# Patient Record
Sex: FEMALE | Race: BLACK OR AFRICAN AMERICAN | Employment: OTHER | URBAN - METROPOLITAN AREA
[De-identification: names, ages, dates, MRNs, and addresses within clinical notes are randomized per-mention and may not be internally consistent; named-entity substitution may affect disease eponyms.]

---

## 2017-10-09 ENCOUNTER — DOCUMENTATION ONLY (OUTPATIENT)
Dept: INTERNAL MEDICINE CLINIC | Age: 27
End: 2017-10-09

## 2017-10-09 ENCOUNTER — OFFICE VISIT (OUTPATIENT)
Dept: INTERNAL MEDICINE CLINIC | Age: 27
End: 2017-10-09

## 2017-10-09 VITALS
RESPIRATION RATE: 16 BRPM | HEART RATE: 79 BPM | DIASTOLIC BLOOD PRESSURE: 56 MMHG | WEIGHT: 90.8 LBS | HEIGHT: 59 IN | OXYGEN SATURATION: 100 % | SYSTOLIC BLOOD PRESSURE: 100 MMHG | TEMPERATURE: 97.9 F | BODY MASS INDEX: 18.31 KG/M2

## 2017-10-09 DIAGNOSIS — K64.2 GRADE III HEMORRHOIDS: ICD-10-CM

## 2017-10-09 DIAGNOSIS — K50.818 CROHN'S DISEASE OF BOTH SMALL AND LARGE INTESTINE WITH OTHER COMPLICATION (HCC): ICD-10-CM

## 2017-10-09 DIAGNOSIS — Z00.00 WELL WOMAN EXAM (NO GYNECOLOGICAL EXAM): Primary | ICD-10-CM

## 2017-10-09 RX ORDER — HYDROCORTISONE ACETATE 25 MG/1
25 SUPPOSITORY RECTAL EVERY 12 HOURS
Qty: 14 SUPPOSITORY | Refills: 0 | Status: SHIPPED | OUTPATIENT
Start: 2017-10-09 | End: 2017-10-16

## 2017-10-09 NOTE — PROGRESS NOTES
Chief Complaint   Patient presents with   [facility]     1. Have you been to the ER, urgent care clinic since your last visit? Hospitalized since your last visit? No    2. Have you seen or consulted any other health care providers outside of the [address] [place] [address] since your last visit? Include any pap smears or colon screening.  No    Patient have discomfort anal area, itching

## 2017-10-09 NOTE — PROGRESS NOTES
HISTORY OF PRESENT ILLNESS  Marlen Domingo is a 32 y.o. female. HPI  Marlen Domingo is new to my practice. Prior care: her prior care was with Dr. Taylor Hi. Last seen there 5 years ago. Records have been reviewed. Health Maintenance  Health Maintenance   Topic Date Due    DTaP/Tdap/Td series (1 - Tdap) 06/02/2011    PAP AKA CERVICAL CYTOLOGY  06/02/2011    INFLUENZA AGE 9 TO ADULT  08/01/2017     Crohn's  Disease  Diagnosis 4 years ago. She is status post colectomy and has also been on infliximab in the past.  Her last infliximab infusion was 1 year ago it was stopped after the birth of her child due to insurance purposes. She has not been to gastroenterology in almost a year. To 8 outstanding medical bills also she wishes to manage her Crohn's \" naturally' and has been making dietary changes. She reports having less than 4 stools per day on average has 2 semi-formed stools. Denies abdominal pain, nausea, vomiting, stool leakage, melena, hematochezia fevers or night sweats. Anal Itching  Started 2 weeks ago. Denies hematochezia, anal intercourse,  & H/o hemorrhoids. SHx: Mental health jean. [facility]. 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 Eyes: Negative for blurred vision and pain. Respiratory: Negative for shortness of breath. Cardiovascular: Negative for chest pain, orthopnea and leg swelling. Gastrointestinal:        Per HPI   Neurological: Negative for focal weakness and headaches. Psychiatric/Behavioral: Negative for depression.      Patient Active Problem List    Diagnosis Date Noted    [facility] 08/10/2012    Gastritis 07/30/2012    Acne     Eczema     Seasonal allergic rhinitis            Allergies   Allergen Reactions    Chocolate Flavor Hives and Swelling     Facial swelling      Visit Vitals    /56 (BP 1 Location: Right arm, BP Patient Position: Sitting)    Pulse 79    Temp 97.9 °F (36.6 °C) (Oral)    Resp 16    Ht 4' 10.62\" (1.489 m)    Wt 90 lb 12.8 oz (41.2 kg)    SpO2 100%    BMI 18.58 kg/m2       Physical Exam   Constitutional: She is oriented to person, place, and time. She appears well-developed. No distress. Eyes: Conjunctivae are normal.   Neck: Neck supple. Cardiovascular: Normal rate, regular rhythm and normal heart sounds. Pulmonary/Chest: Effort normal and breath sounds normal. No respiratory distress. She has no wheezes. She has no rales. She exhibits no tenderness. Genitourinary: Rectal exam shows external hemorrhoid and fissure. Rectal exam shows no tenderness. Genitourinary Comments: Nurse chaperone present   Nae Wilson      Neurological: She is alert and oriented to person, place, and time. Skin: Skin is warm. Psychiatric: She has a normal mood and affect. ASSESSMENT and PLAN  Diagnoses and all orders for this visit:    1. Well woman exam (no gynecological exam)-screening labs ordered. Patient declines flu vaccine today.   -     CBC WITH AUTOMATED DIFF  -     LIPID PANEL  -     METABOLIC PANEL, COMPREHENSIVE  -     THYROID PANEL  -     TSH 3RD GENERATION  -     VITAMIN D, 25 HYDROXY  -     VITAMIN B12 & FOLATE  -     IRON PROFILE    2. Crohn's disease of both small and large intestine with other complication (HCC) overdue for gastroenterology follow-up. Notes stable symptoms fortunately. Will defer further workup at this time she she was scheduled with gastroenterology for October 21. Red flag symptoms warrant earlier clinical evaluation or ER presentation was reviewed. -     VITAMIN B12 & FOLATE  -     IRON PROFILE  -     REFERRAL TO GASTROENTEROLOGY    3. Lactating motherresume prenatal vitamin  -     -folic-omega-3-fish oil 400-32.5 mcg-mg chew; Take 1 Tab by mouth daily.     4. Grade III hemorrhoidstrial of Entocort until she is seen by gastroenterology.  -     REFERRAL TO GASTROENTEROLOGY  -     hydrocortisone (ANUCORT-HC) 25 mg supp; Insert 1 Suppository into rectum every twelve (12) hours for 7 days. Follow-up Disposition:  Return in about 3 months (around 1/9/2018) for Follow-up. Medication risks/benefits/costs/interactions/alternatives discussed with patient. Delfina Palumbo  was given an after visit summary which includes diagnoses, current medications, & vitals. she expressed understanding with the diagnosis and plan.

## 2017-10-09 NOTE — PATIENT INSTRUCTIONS
It was a pleasure to see you! As discussed:    Health Maintenance   I have ordered your age appropriate labs please complete them. You will need to fast 10-12hrs before your appointment. Remember goal for exercise is 150minutes of moderate exercise a week and diet goal is to eat 50% fruits and vegetables with minimal sugar, fat and oil daily. See health.gov or choosemyplate.gov for more details. Your cervical cancer and breast cancer screening will be completed by your ob/ gyn as scheduled. Crohn's Disease  See Gastroenterology as scheduled. Start multivitamin         Hemorrhoids: Care Instructions  Your Care Instructions    Hemorrhoids are enlarged veins that develop in the anal canal. Bleeding during bowel movements, itching, swelling, and rectal pain are the most common symptoms. They can be uncomfortable at times, but hemorrhoids rarely are a serious problem. You can treat most hemorrhoids with simple changes to your diet and bowel habits. These changes include eating more fiber and not straining to pass stools. Most hemorrhoids do not need surgery or other treatment unless they are very large and painful or bleed a lot. Follow-up care is a key part of your treatment and safety. Be sure to make and go to all appointments, and call your doctor if you are having problems. Its also a good idea to know your test results and keep a list of the medicines you take. How can you care for yourself at home? · Sit in a few inches of warm water (sitz bath) 3 times a day and after bowel movements. The warm water helps with pain and itching. · Put ice on your anal area several times a day for 10 minutes at a time. Put a thin cloth between the ice and your skin. Follow this by placing a warm, wet towel on the area for another 10 to 20 minutes. · Take pain medicines exactly as directed. ¨ If the doctor gave you a prescription medicine for pain, take it as prescribed.   ¨ If you are not taking a prescription pain medicine, ask your doctor if you can take an over-the-counter medicine. · Keep the anal area clean, but be gentle. Use water and a fragrance-free soap, such as [place], or use baby wipes or medicated pads, such as Tucks. · Wear cotton underwear and loose clothing to decrease moisture in the anal area. · Eat more fiber. Include foods such as whole-grain breads and cereals, raw vegetables, raw and dried fruits, and beans. · Drink plenty of fluids, enough so that your urine is light yellow or clear like water. If you have kidney, heart, or liver disease and have to limit fluids, talk with your doctor before you increase the amount of fluids you drink. · Use a stool softener that contains bran or psyllium. You can save money by buying bran or psyllium (available in bulk at most health food stores) and sprinkling it on foods or stirring it into fruit juice. Or you can use a product such as Metamucil or Hydrocil. · Practice healthy bowel habits. ¨ Go to the bathroom as soon as you have the urge. ¨ Avoid straining to pass stools. Relax and give yourself time to let things happen naturally. ¨ Do not hold your breath while passing stools. ¨ Do not read while sitting on the toilet. Get off the toilet as soon as you have finished. · Take your medicines exactly as prescribed. Call your doctor if you think you are having a problem with your medicine. When should you call for help? Call 911 anytime you think you may need emergency care. For example, call if:  · You pass maroon or very bloody stools. Call your doctor now or seek immediate medical care if:  · You have increased pain. · You have increased bleeding. Watch closely for changes in your health, and be sure to contact your doctor if:  · Your symptoms have not improved after 3 or 4 days. Where can you learn more? Go to http://bebeto-marielos.info/.   Enter F228 in the search box to learn more about \"Hemorrhoids: Care Instructions. \"  Current as of: August 9, 2016  Content Version: 11.3  © 5454-8358 [employer]. Care instructions adapted under license by [employer] (which disclaims liability or warranty for this information). If you have questions about a medical condition or this instruction, always ask your healthcare professional. [address] any warranty or liability for your use of this information. Crohn's Disease: Care Instructions  Your Care Instructions    Crohn's disease is a lifelong inflammatory bowel disease (IBD). Parts of the digestive tract get swollen and irritated and may develop deep sores called ulcers. Crohn's disease usually occurs in the last part of the small intestine and the first part of the large intestine. But it can develop anywhere from the mouth to the anus. The main symptoms of Crohn's disease are belly pain, diarrhea, fever, and weight loss. Some people may have constipation. Crohn's disease also sometimes causes problems with the joints, eyes, or skin. Your symptoms may be mild at some times and severe at others. The disease can also go into remission, which means that it is not active and you have no symptoms. Bad attacks of Crohn's disease often have to be treated in the hospital so that you can get medicines and liquids through a tube in your vein, called an IV. This gives your digestive system time to rest and recover. Talk with your doctor about the best treatments for you. You may need medicines that help prevent or treat flare-ups of the disease. You may need surgery to remove part of your bowel if you have an abnormal opening in the bowel (fistula), an abscess, or a bowel obstruction. In some cases, surgery is needed if medicines do not work. But symptoms often return to other areas of the intestines after surgery. Learning good self-care can help you reduce your symptoms and manage Crohn's disease.   Follow-up care is a key part of your treatment and safety. Be sure to make and go to all appointments, and call your doctor if you are having problems. Its also a good idea to know your test results and keep a list of the medicines you take. How can you care for yourself at home? · Take your medicines exactly as prescribed. Call your doctor if you think you are having a problem with your medicine. You will get more details on the specific medicines your doctor prescribes. · Do not take anti-inflammatory medicines, such as aspirin, ibuprofen (Advil, Motrin), or naproxen (Aleve). They may make your symptoms worse. Do not take any other medicines or herbal products without talking to your doctor first.  · Avoid foods that make your symptoms worse. These might include milk, alcohol, high-fiber foods, or spicy foods. · Eat a healthy diet. Make sure to get enough iron. Rectal bleeding may make you lose iron. Good sources of iron include beef, lentils, spinach, raisins, and iron-enriched breads and cereals. · Drink liquid meal replacements if your doctor recommends them. These are high in calories and contain vitamins and minerals. Severe symptoms may make it hard for your body to absorb vitamins and minerals. · Do not smoke. Smoking makes Crohn's disease worse. If you need help quitting, talk to your doctor about stop-smoking programs and medicines. These can increase your chances of quitting for good. · Seek support from friends and family to help cope with Crohn's disease. The illness can affect all parts of your life. Get counseling if you need it. When should you call for help? Call 911 anytime you think you may need emergency care. For example, call if:  · You have severe belly pain. · You passed out (lost consciousness). Call your doctor now or seek immediate medical care if:  · You have signs of needing more fluids. You have sunken eyes and a dry mouth, and you pass only a little dark urine.   · You have pain and swelling in the anal area, or you have pus draining from the anal area. · You have a fever or shaking chills. · Your belly is bloated. Watch closely for changes in your health, and be sure to contact your doctor if:  · Your symptoms get worse. · You have diarrhea for more than 2 weeks. · Your pain is not steadily getting better. · You have unexplained weight loss. Where can you learn more? Go to http://bebeto-marielos.info/. Enter 21 [phone] in the search box to learn more about \"Crohn's Disease: Care Instructions. \"  Current as of: August 9, 2016  Content Version: 11.3  © 2007-4053 [employer]. Care instructions adapted under license by [employer] (which disclaims liability or warranty for this information). If you have questions about a medical condition or this instruction, always ask your healthcare professional. [address] any warranty or liability for your use of this information.

## 2017-10-09 NOTE — MR AVS SNAPSHOT
Visit Information Date & Time Provider Department Dept. Phone Encounter #  
 10/9/2017  2:00 PM Lashell Tyler MD [facility] Internal Medicine [phone] [id number] Follow-up Instructions Return in about 3 months (around 1/9/2018) for Follow-up. Upcoming Health Maintenance Date Due DTaP/Tdap/Td series (1 - Tdap) 6/2/2011 PAP AKA CERVICAL CYTOLOGY 4/1/2019 Allergies as of 10/9/2017  Review Complete On: 10/9/2017 By: Lashell Tyler MD  
  
 Severity Noted Reaction Type Reactions Chocolate Flavor  06/29/2012    Hives, Swelling Facial swelling Current Immunizations  Reviewed on 6/29/2012 No immunizations on file. Not reviewed this visit You Were Diagnosed With   
  
 Codes Comments Well woman exam (no gynecological exam)    -  Primary ICD-10-CM: Z00.00 ICD-9-CM: V70.0 Crohn's disease of both small and large intestine with other complication ([facility].)     QXL-67-HZ: P15.248 ICD-9-CM: 555.2 Lactating mother     ICD-10-CM: Z39.1 ICD-9-CM: V24.1 Grade III hemorrhoids     ICD-10-CM: Z77.1 ICD-9-CM: 455.0 Vitals BP Pulse Temp Resp Height(growth percentile) Weight(growth percentile) 100/56 (BP 1 Location: Right arm, BP Patient Position: Sitting) 79 97.9 °F (36.6 °C) (Oral) 16 4' 10.62\" (1.489 m) 90 lb 12.8 oz (41.2 kg) SpO2 BMI OB Status Smoking Status 100% 18.58 kg/m2 Unknown Never Smoker Vitals History BMI and BSA Data Body Mass Index Body Surface Area 18.58 kg/m 2 1.31 m 2 Preferred Pharmacy Pharmacy Name Phone Radha Robledo [facility] [address]  [place] [phone] Your Updated Medication List  
  
   
This list is accurate as of: 10/9/17  2:52 PM.  Always use your most recent med list.  
  
  
  
  
 hydrocortisone 25 mg Supp Commonly known as:  ANUCORT-HC  
 Insert 1 Suppository into rectum every twelve (12) hours for 7 days. -folic-omega-3-fish oil 400-32.5 mcg-mg [facility] Take 1 Tab by mouth daily. Prescriptions Sent to Pharmacy Refills -folic-omega-3-fish oil 400-32.5 mcg-mg chew 4 Sig: Take 1 Tab by mouth daily. Class: Normal  
 Pharmacy: [address] Ph #: [phone] Route: Oral  
 hydrocortisone (ANUCORT-HC) 25 mg supp 0 Sig: Insert 1 Suppository into rectum every twelve (12) hours for 7 days. Class: Normal  
 Pharmacy: [address] Ph #: [phone] Route: Rectal  
  
We Performed the Following CBC WITH AUTOMATED DIFF [39975 CPT(R)] IRON PROFILE K480718 CPT(R)] LIPID PANEL [95297 CPT(R)] METABOLIC PANEL, COMPREHENSIVE [93458 CPT(R)] REFERRAL TO GASTROENTEROLOGY [JQY06 Custom] THYROID PANEL S364317 CPT(R)] TSH 3RD GENERATION [15822 CPT(R)] VITAMIN B12 & FOLATE [88345 CPT(R)] VITAMIN D, 25 HYDROXY [id number] CPT(R)] Follow-up Instructions Return in about 3 months (around 1/9/2018) for Follow-up. Referral Information Referral ID Referred By Referred To  
  
 [id number] BARRETT, [id number]  Michael Galeas MD   
   [address] [place] [address], [facility] Phone: [phone] Fax: [phone] Visits Status Start Date End Date 1 New Request 10/9/17 10/9/18 If your referral has a status of pending review or denied, additional information will be sent to support the outcome of this decision. Patient Instructions It was a pleasure to see you! As discussed: 
 
Health Maintenance I have ordered your age appropriate labs please complete them. You will need to fast 10-12hrs before your appointment. Remember goal for exercise is 150minutes of moderate exercise a week and diet goal is to eat 50% fruits and vegetables with minimal sugar, fat and oil daily. See health.gov or choosemyplate.gov for more details. Your cervical cancer and breast cancer screening will be completed by your ob/ gyn as scheduled. Crohn's Disease See Gastroenterology as scheduled. Start multivitamin Hemorrhoids: Care Instructions Your Care Instructions Hemorrhoids are enlarged veins that develop in the anal canal. Bleeding during bowel movements, itching, swelling, and rectal pain are the most common symptoms. They can be uncomfortable at times, but hemorrhoids rarely are a serious problem. You can treat most hemorrhoids with simple changes to your diet and bowel habits. These changes include eating more fiber and not straining to pass stools. Most hemorrhoids do not need surgery or other treatment unless they are very large and painful or bleed a lot. Follow-up care is a key part of your treatment and safety. Be sure to make and go to all appointments, and call your doctor if you are having problems. Its also a good idea to know your test results and keep a list of the medicines you take. How can you care for yourself at home? · Sit in a few inches of warm water (sitz bath) 3 times a day and after bowel movements. The warm water helps with pain and itching. · Put ice on your anal area several times a day for 10 minutes at a time. Put a thin cloth between the ice and your skin. Follow this by placing a warm, wet towel on the area for another 10 to 20 minutes. · Take pain medicines exactly as directed. ¨ If the doctor gave you a prescription medicine for pain, take it as prescribed. ¨ If you are not taking a prescription pain medicine, ask your doctor if you can take an over-the-counter medicine. · Keep the anal area clean, but be gentle.  Use water and a fragrance-free soap, such as [place], or use baby wipes or medicated pads, such as Tucks. · Wear cotton underwear and loose clothing to decrease moisture in the anal area. · Eat more fiber. Include foods such as whole-grain breads and cereals, raw vegetables, raw and dried fruits, and beans. · Drink plenty of fluids, enough so that your urine is light yellow or clear like water. If you have kidney, heart, or liver disease and have to limit fluids, talk with your doctor before you increase the amount of fluids you drink. · Use a stool softener that contains bran or psyllium. You can save money by buying bran or psyllium (available in bulk at most health food stores) and sprinkling it on foods or stirring it into fruit juice. Or you can use a product such as Metamucil or Hydrocil. · Practice healthy bowel habits. ¨ Go to the bathroom as soon as you have the urge. ¨ Avoid straining to pass stools. Relax and give yourself time to let things happen naturally. ¨ Do not hold your breath while passing stools. ¨ Do not read while sitting on the toilet. Get off the toilet as soon as you have finished. · Take your medicines exactly as prescribed. Call your doctor if you think you are having a problem with your medicine. When should you call for help? Call 911 anytime you think you may need emergency care. For example, call if: 
· You pass maroon or very bloody stools. Call your doctor now or seek immediate medical care if: 
· You have increased pain. · You have increased bleeding. Watch closely for changes in your health, and be sure to contact your doctor if: 
· Your symptoms have not improved after 3 or 4 days. Where can you learn more? Go to http://bebeto-marielos.info/. Enter F228 in the search box to learn more about \"Hemorrhoids: Care Instructions. \" Current as of: August 9, 2016 Content Version: 11.3 © 6371-5980 [employer].  Care instructions adapted under license by [address] (which disclaims liability or warranty for this information). If you have questions about a medical condition or this instruction, always ask your healthcare professional. [address] any warranty or liability for your use of this information. Crohn's Disease: Care Instructions Your Care Instructions Crohn's disease is a lifelong inflammatory bowel disease (IBD). Parts of the digestive tract get swollen and irritated and may develop deep sores called ulcers. Crohn's disease usually occurs in the last part of the small intestine and the first part of the large intestine. But it can develop anywhere from the mouth to the anus. The main symptoms of Crohn's disease are belly pain, diarrhea, fever, and weight loss. Some people may have constipation. Crohn's disease also sometimes causes problems with the joints, eyes, or skin. Your symptoms may be mild at some times and severe at others. The disease can also go into remission, which means that it is not active and you have no symptoms. Bad attacks of Crohn's disease often have to be treated in the hospital so that you can get medicines and liquids through a tube in your vein, called an IV. This gives your digestive system time to rest and recover. Talk with your doctor about the best treatments for you. You may need medicines that help prevent or treat flare-ups of the disease. You may need surgery to remove part of your bowel if you have an abnormal opening in the bowel (fistula), an abscess, or a bowel obstruction. In some cases, surgery is needed if medicines do not work. But symptoms often return to other areas of the intestines after surgery. Learning good self-care can help you reduce your symptoms and manage Crohn's disease. Follow-up care is a key part of your treatment and safety.  Be sure to make and go to all appointments, and call your doctor if you are having problems. Its also a good idea to know your test results and keep a list of the medicines you take. How can you care for yourself at home? · Take your medicines exactly as prescribed. Call your doctor if you think you are having a problem with your medicine. You will get more details on the specific medicines your doctor prescribes. · Do not take anti-inflammatory medicines, such as aspirin, ibuprofen (Advil, Motrin), or naproxen (Aleve). They may make your symptoms worse. Do not take any other medicines or herbal products without talking to your doctor first. 
· Avoid foods that make your symptoms worse. These might include milk, alcohol, high-fiber foods, or spicy foods. · Eat a healthy diet. Make sure to get enough iron. Rectal bleeding may make you lose iron. Good sources of iron include beef, lentils, spinach, raisins, and iron-enriched breads and cereals. · Drink liquid meal replacements if your doctor recommends them. These are high in calories and contain vitamins and minerals. Severe symptoms may make it hard for your body to absorb vitamins and minerals. · Do not smoke. Smoking makes Crohn's disease worse. If you need help quitting, talk to your doctor about stop-smoking programs and medicines. These can increase your chances of quitting for good. · Seek support from friends and family to help cope with Crohn's disease. The illness can affect all parts of your life. Get counseling if you need it. When should you call for help? Call 911 anytime you think you may need emergency care. For example, call if: 
· You have severe belly pain. · You passed out (lost consciousness). Call your doctor now or seek immediate medical care if: 
· You have signs of needing more fluids. You have sunken eyes and a dry mouth, and you pass only a little dark urine. · You have pain and swelling in the anal area, or you have pus draining from the anal area. · You have a fever or shaking chills. · Your belly is bloated. Watch closely for changes in your health, and be sure to contact your doctor if: 
· Your symptoms get worse. · You have diarrhea for more than 2 weeks. · Your pain is not steadily getting better. · You have unexplained weight loss. Where can you learn more? Go to http://bebeto-marielos.info/. Enter 21 [phone] in the search box to learn more about \"Crohn's Disease: Care Instructions. \" Current as of: August 9, 2016 Content Version: 11.3 © 8739-4079 [employer]. Care instructions adapted under license by [employer] (which disclaims liability or warranty for this information). If you have questions about a medical condition or this instruction, always ask your healthcare professional. [address] any warranty or liability for your use of this information. Introducing [facility]! Dear Norma Quintana: 
Thank you for requesting a [employer] account. Our records indicate that you already have an active [employer] account. You can access your account anytime at [email or web address] Did you know that you can access your hospital and ER discharge instructions at any time in [employer]? You can also review all of your test results from your hospital stay or ER visit. Additional Information If you have questions, please visit the Frequently Asked Questions section of the [employer] website at [email or web address] Remember, [employer] is NOT to be used for urgent needs. For medical emergencies, dial 911. Now available from your iPhone and Android! Please provide this summary of care documentation to your next provider. Your primary care clinician is listed as Rebeca Flowers. If you have any questions after today's visit, please call [phone].

## 2018-04-13 LAB
CHLAMYDIA, EXTERNAL: NEGATIVE
HBSAG, EXTERNAL: NEGATIVE
HIV, EXTERNAL: NON REACTIVE
N. GONORRHEA, EXTERNAL: NEGATIVE
RUBELLA, EXTERNAL: NORMAL
TYPE, ABO & RH, EXTERNAL: NORMAL

## 2018-08-08 ENCOUNTER — HOSPITAL ENCOUNTER (EMERGENCY)
Age: 28
End: 2018-08-08
Attending: OBSTETRICS & GYNECOLOGY | Admitting: OBSTETRICS & GYNECOLOGY

## 2018-09-04 ENCOUNTER — ANESTHESIA EVENT (OUTPATIENT)
Dept: LABOR AND DELIVERY | Age: 28
End: 2018-09-04

## 2018-09-05 ENCOUNTER — HOSPITAL ENCOUNTER (INPATIENT)
Age: 28
LOS: 3 days | Discharge: HOME | End: 2018-09-08
Attending: OBSTETRICS & GYNECOLOGY | Admitting: OBSTETRICS & GYNECOLOGY
Payer: COMMERCIAL

## 2018-09-05 LAB
ERYTHROCYTE [DISTWIDTH] IN BLOOD BY AUTOMATED COUNT: 14.6 % (ref 11.5–14.5)
HCT VFR BLD AUTO: 33.1 % (ref 35–47)
HGB BLD-MCNC: 10.8 G/DL (ref 11.5–16)
MCH RBC QN AUTO: 28.7 PG (ref 26–34)
MCHC RBC AUTO-ENTMCNC: 32.6 G/DL (ref 30–36.5)
MCV RBC AUTO: 88 FL (ref 80–99)
NRBC # BLD: 0 K/UL (ref 0–0.01)
NRBC BLD-RTO: 0 PER 100 WBC
PLATELET # BLD AUTO: 173 K/UL (ref 150–400)
PMV BLD AUTO: 11.2 FL (ref 8.9–12.9)
RBC # BLD AUTO: 3.76 M/UL (ref 3.8–5.2)
WBC # BLD AUTO: 9.2 K/UL (ref 3.6–11)

## 2018-09-05 PROCEDURE — C1765 ADHESION BARRIER: HCPCS

## 2018-09-05 PROCEDURE — 77030008459 HC STPLR SKN COOP -B

## 2018-09-05 PROCEDURE — 77030031139 HC SUT VCRL2 J&J -A

## 2018-09-05 PROCEDURE — 10907ZC DRAINAGE OF AMNIOTIC FLUID, THERAPEUTIC FROM PRODUCTS OF CONCEPTION, VIA NATURAL OR ARTIFICIAL OPENING: ICD-10-PCS | Performed by: OBSTETRICS & GYNECOLOGY

## 2018-09-05 PROCEDURE — A4300 CATH IMPL VASC ACCESS PORTAL: HCPCS

## 2018-09-05 PROCEDURE — 77030018836 HC SOL IRR NACL ICUM -A

## 2018-09-05 PROCEDURE — 85027 COMPLETE CBC AUTOMATED: CPT | Performed by: OBSTETRICS & GYNECOLOGY

## 2018-09-05 PROCEDURE — 74011250636 HC RX REV CODE- 250/636: Performed by: OBSTETRICS & GYNECOLOGY

## 2018-09-05 PROCEDURE — 77030018846 HC SOL IRR STRL H20 ICUM -A

## 2018-09-05 PROCEDURE — 74011250636 HC RX REV CODE- 250/636: Performed by: ANESTHESIOLOGY

## 2018-09-05 PROCEDURE — 77030007866 HC KT SPN ANES BBMI -B: Performed by: ANESTHESIOLOGY

## 2018-09-05 PROCEDURE — 76010000392 HC C SECN EA ADDL 0.5 HR: Performed by: OBSTETRICS & GYNECOLOGY

## 2018-09-05 PROCEDURE — 74011000258 HC RX REV CODE- 258: Performed by: ANESTHESIOLOGY

## 2018-09-05 PROCEDURE — 77030011220 HC DEV ELECSURG COVD -B

## 2018-09-05 PROCEDURE — 36415 COLL VENOUS BLD VENIPUNCTURE: CPT | Performed by: OBSTETRICS & GYNECOLOGY

## 2018-09-05 PROCEDURE — 76060000078 HC EPIDURAL ANESTHESIA: Performed by: OBSTETRICS & GYNECOLOGY

## 2018-09-05 PROCEDURE — 65410000002 HC RM PRIVATE OB

## 2018-09-05 PROCEDURE — 77030011255 HC DSG AQUACEL AG BMS -A

## 2018-09-05 PROCEDURE — 75410000003 HC RECOV DEL/VAG/CSECN EA 0.5 HR: Performed by: OBSTETRICS & GYNECOLOGY

## 2018-09-05 PROCEDURE — 74011250637 HC RX REV CODE- 250/637: Performed by: OBSTETRICS & GYNECOLOGY

## 2018-09-05 PROCEDURE — 74011000250 HC RX REV CODE- 250

## 2018-09-05 PROCEDURE — 77030032490 HC SLV COMPR SCD KNE COVD -B

## 2018-09-05 PROCEDURE — 74011250636 HC RX REV CODE- 250/636

## 2018-09-05 PROCEDURE — 76010000391 HC C SECN FIRST 1 HR: Performed by: OBSTETRICS & GYNECOLOGY

## 2018-09-05 RX ORDER — KETOROLAC TROMETHAMINE 30 MG/ML
30 INJECTION, SOLUTION INTRAMUSCULAR; INTRAVENOUS
Status: CANCELLED | OUTPATIENT
Start: 2018-09-05 | End: 2018-09-06

## 2018-09-05 RX ORDER — OXYCODONE AND ACETAMINOPHEN 5; 325 MG/1; MG/1
1 TABLET ORAL
Status: DISCONTINUED | OUTPATIENT
Start: 2018-09-05 | End: 2018-09-08 | Stop reason: HOSPADM

## 2018-09-05 RX ORDER — ONDANSETRON 4 MG/1
4 TABLET, ORALLY DISINTEGRATING ORAL
Status: DISCONTINUED | OUTPATIENT
Start: 2018-09-05 | End: 2018-09-08 | Stop reason: HOSPADM

## 2018-09-05 RX ORDER — HYDROCORTISONE ACETATE PRAMOXINE HCL 2.5; 1 G/100G; G/100G
CREAM TOPICAL AS NEEDED
Status: DISCONTINUED | OUTPATIENT
Start: 2018-09-05 | End: 2018-09-08 | Stop reason: HOSPADM

## 2018-09-05 RX ORDER — ONDANSETRON 2 MG/ML
INJECTION INTRAMUSCULAR; INTRAVENOUS AS NEEDED
Status: DISCONTINUED | OUTPATIENT
Start: 2018-09-05 | End: 2018-09-05 | Stop reason: HOSPADM

## 2018-09-05 RX ORDER — OXYTOCIN/RINGER'S LACTATE 20/1000 ML
125-1000 PLASTIC BAG, INJECTION (ML) INTRAVENOUS AS NEEDED
Status: DISCONTINUED | OUTPATIENT
Start: 2018-09-05 | End: 2018-09-08 | Stop reason: HOSPADM

## 2018-09-05 RX ORDER — DIPHENHYDRAMINE HCL 25 MG
50 CAPSULE ORAL
Status: DISCONTINUED | OUTPATIENT
Start: 2018-09-05 | End: 2018-09-08 | Stop reason: HOSPADM

## 2018-09-05 RX ORDER — ACETAMINOPHEN 325 MG/1
650 TABLET ORAL
Status: DISCONTINUED | OUTPATIENT
Start: 2018-09-05 | End: 2018-09-08 | Stop reason: HOSPADM

## 2018-09-05 RX ORDER — OXYCODONE AND ACETAMINOPHEN 5; 325 MG/1; MG/1
2 TABLET ORAL
Status: DISCONTINUED | OUTPATIENT
Start: 2018-09-05 | End: 2018-09-08 | Stop reason: HOSPADM

## 2018-09-05 RX ORDER — BUPIVACAINE HYDROCHLORIDE 7.5 MG/ML
INJECTION, SOLUTION EPIDURAL; RETROBULBAR AS NEEDED
Status: DISCONTINUED | OUTPATIENT
Start: 2018-09-05 | End: 2018-09-05 | Stop reason: HOSPADM

## 2018-09-05 RX ORDER — MORPHINE SULFATE 1 MG/ML
INJECTION, SOLUTION EPIDURAL; INTRATHECAL; INTRAVENOUS AS NEEDED
Status: DISCONTINUED | OUTPATIENT
Start: 2018-09-05 | End: 2018-09-05 | Stop reason: HOSPADM

## 2018-09-05 RX ORDER — AMMONIA 15 % (W/V)
1 AMPUL (EA) INHALATION AS NEEDED
Status: DISCONTINUED | OUTPATIENT
Start: 2018-09-05 | End: 2018-09-08 | Stop reason: HOSPADM

## 2018-09-05 RX ORDER — SODIUM CHLORIDE, SODIUM LACTATE, POTASSIUM CHLORIDE, CALCIUM CHLORIDE 600; 310; 30; 20 MG/100ML; MG/100ML; MG/100ML; MG/100ML
125 INJECTION, SOLUTION INTRAVENOUS CONTINUOUS
Status: DISCONTINUED | OUTPATIENT
Start: 2018-09-05 | End: 2018-09-08 | Stop reason: HOSPADM

## 2018-09-05 RX ORDER — SODIUM CHLORIDE 0.9 % (FLUSH) 0.9 %
5-10 SYRINGE (ML) INJECTION EVERY 8 HOURS
Status: DISCONTINUED | OUTPATIENT
Start: 2018-09-05 | End: 2018-09-08 | Stop reason: HOSPADM

## 2018-09-05 RX ORDER — OXYTOCIN/RINGER'S LACTATE 20/1000 ML
125-1000 PLASTIC BAG, INJECTION (ML) INTRAVENOUS
Status: CANCELLED | OUTPATIENT
Start: 2018-09-05

## 2018-09-05 RX ORDER — ZOLPIDEM TARTRATE 5 MG/1
5 TABLET ORAL
Status: DISCONTINUED | OUTPATIENT
Start: 2018-09-05 | End: 2018-09-08 | Stop reason: HOSPADM

## 2018-09-05 RX ORDER — HYDROCORTISONE 1 %
CREAM (GRAM) TOPICAL AS NEEDED
Status: DISCONTINUED | OUTPATIENT
Start: 2018-09-05 | End: 2018-09-08 | Stop reason: HOSPADM

## 2018-09-05 RX ORDER — OXYTOCIN/RINGER'S LACTATE 20/1000 ML
PLASTIC BAG, INJECTION (ML) INTRAVENOUS
Status: DISCONTINUED | OUTPATIENT
Start: 2018-09-05 | End: 2018-09-05 | Stop reason: HOSPADM

## 2018-09-05 RX ORDER — CEFAZOLIN SODIUM/WATER 2 G/20 ML
2 SYRINGE (ML) INTRAVENOUS ONCE
Status: COMPLETED | OUTPATIENT
Start: 2018-09-05 | End: 2018-09-05

## 2018-09-05 RX ORDER — SIMETHICONE 80 MG
80 TABLET,CHEWABLE ORAL
Status: DISCONTINUED | OUTPATIENT
Start: 2018-09-05 | End: 2018-09-08 | Stop reason: HOSPADM

## 2018-09-05 RX ORDER — SODIUM CHLORIDE 0.9 % (FLUSH) 0.9 %
5-10 SYRINGE (ML) INJECTION AS NEEDED
Status: DISCONTINUED | OUTPATIENT
Start: 2018-09-05 | End: 2018-09-08 | Stop reason: HOSPADM

## 2018-09-05 RX ORDER — OXYTOCIN/RINGER'S LACTATE 20/1000 ML
PLASTIC BAG, INJECTION (ML) INTRAVENOUS
Status: COMPLETED
Start: 2018-09-05 | End: 2018-09-05

## 2018-09-05 RX ORDER — DOCUSATE SODIUM 100 MG/1
100 CAPSULE, LIQUID FILLED ORAL
Status: DISCONTINUED | OUTPATIENT
Start: 2018-09-05 | End: 2018-09-08 | Stop reason: HOSPADM

## 2018-09-05 RX ORDER — PHENYLEPHRINE 10 MG/250 ML(40 MCG/ML)IN 0.9 % SOD.CHLORIDE INTRAVENOUS
Status: DISPENSED
Start: 2018-09-05 | End: 2018-09-06

## 2018-09-05 RX ORDER — ACETAMINOPHEN 10 MG/ML
INJECTION, SOLUTION INTRAVENOUS AS NEEDED
Status: DISCONTINUED | OUTPATIENT
Start: 2018-09-05 | End: 2018-09-05 | Stop reason: HOSPADM

## 2018-09-05 RX ADMIN — PROMETHAZINE HYDROCHLORIDE 12.5 MG: 25 INJECTION INTRAMUSCULAR; INTRAVENOUS at 22:42

## 2018-09-05 RX ADMIN — BUPIVACAINE HYDROCHLORIDE 11 MG: 7.5 INJECTION, SOLUTION EPIDURAL; RETROBULBAR at 19:34

## 2018-09-05 RX ADMIN — ACETAMINOPHEN 1000 MG: 10 INJECTION, SOLUTION INTRAVENOUS at 19:58

## 2018-09-05 RX ADMIN — ONDANSETRON 4 MG: 2 INJECTION INTRAMUSCULAR; INTRAVENOUS at 19:32

## 2018-09-05 RX ADMIN — ONDANSETRON 4 MG: 4 TABLET, ORALLY DISINTEGRATING ORAL at 21:49

## 2018-09-05 RX ADMIN — SODIUM CHLORIDE, SODIUM LACTATE, POTASSIUM CHLORIDE, AND CALCIUM CHLORIDE 125 ML/HR: 600; 310; 30; 20 INJECTION, SOLUTION INTRAVENOUS at 17:34

## 2018-09-05 RX ADMIN — Medication: at 19:54

## 2018-09-05 RX ADMIN — SODIUM CHLORIDE, SODIUM LACTATE, POTASSIUM CHLORIDE, AND CALCIUM CHLORIDE: 600; 310; 30; 20 INJECTION, SOLUTION INTRAVENOUS at 19:32

## 2018-09-05 RX ADMIN — MORPHINE SULFATE 200 MCG: 1 INJECTION, SOLUTION EPIDURAL; INTRATHECAL; INTRAVENOUS at 19:34

## 2018-09-05 RX ADMIN — Medication 2 G: at 19:24

## 2018-09-05 NOTE — IP AVS SNAPSHOT
[address] [address] 
[phone] Patient: Oracio Han 
MRN: [id number] TKV:6/6/4102 A check marcie indicates which time of day the medication should be taken. My Medications START taking these medications Instructions Each Dose to Equal  
 Morning Noon Evening Bedtime  
 oxyCODONE-acetaminophen 5-325 mg per tablet Commonly known as:  PERCOCET Your last dose was: Your next dose is: Take 1 Tab by mouth every four (4) hours as needed. Max Daily Amount: 6 Tabs. 1 Tab CONTINUE taking these medications Instructions Each Dose to Equal  
 Morning Noon Evening Bedtime -folic-omega-3-fish oil 400-32.5 mcg-mg Monique Kaitlyn Your last dose was: Your next dose is: Take 1 Tab by mouth daily. 1 Tab Where to Get Your Medications Information on where to get these meds will be given to you by the nurse or doctor. ! Ask your nurse or doctor about these medications  
  oxyCODONE-acetaminophen 5-325 mg per tablet

## 2018-09-05 NOTE — H&P
EDC:2018 EGA: 41 weeks, 1 days Primary Provider:  Hernán Page MD 
 
CC:  postdates. History of Present Illness: 
 at 41.1w presents to L&D from office for post-dates, with borderline fluid, and calcified placenta. sent to L&D with delivery recommended. pt had wanted TOLAC, but upon arrival to L&D agrees to RCS. Pregnancy c/b hx of HSV, not on anti-virals. Patient's Prenatal Care with Doctor of Record Dano Travis MD Notable For - Post dates Post dates *Note: RLTCS 18 10am [facility] FELICITY, PAT  10am 
Size less than dates: growth OK: efw 17%, constitutionally small GBS positive RX in labor Eczema on cutar and topical steroid (cat C), pregnant HSV-Valtrex @ 36 wks 18 Prev LTCS (nrfs) desires  consent signed _plan repeat at 41wks_ Normal pregnancy multigravida, transfer @20wks, declined genetics. CROHN'S DISEASE Impression & Recommendations: 
 
Problem # 1:  Post dates (AJU-521.12) (CMR03-U80.0) BPP 10/10, however DENNIS 5, and old looking placenta, so delivery recommnded by MFM and myself - pt counseled on option of RCS (recommended given unfavorable cervix, post-dates, low fluid) vs TOLAC (with lower chance of success given above factors). - she agrees to CS - risks of bleeding/transfusion, infection, injury to surrounding organs needing further surgery for repair. We also discussed that after 2 CS we recommend further deliveries be CS. She plans more children and we reviewed that risks increase after 4 CS. - questions are answered and she desires to proceed. Problem # 2:  HSV-Valtrex @ 36 wks 18 (HJU-077.00) (JLV24-T39.547) Problem # 3:  Prev LTCS (nrfs) desires  consent signed _plan repeat at 41wks_ (RXL-238.58) (JDT08-Y03.211) Past Pregnancy History :  2 Term Births:  1 Premature Births: 0 Living Children: 1 Para:   1 Mult. Births:  0 Prev : 1 Aborta:  0 Elect. Ab:  0 Spont. Ab:  0 Ectopics:  0 Pregnancy # 1 Delivery date:   2016 Weeks Gestation: 41 6/7 Delivery type:   LTCS Anesthesia type:   epidural 
   Delivery location:   [facility] Infant Sex:  Male Birth weight:  6#14 Name:  Nate Notice Comments:  Marcello Brennan - Apgars 9/9, Low Transverse  Section due to non reassuring fetal surveillance  and failure to progress. Induction done due to postdates. Desires circ. Past Medical History: 
   Reviewed history from 10/05/2016 and no changes required: 
      Dizziness Eczema Crohn's Disease- can not use ibuprofen or caffeine  Dr. Villa Weber 
      + HSV 2- 10/2013 Colonscopy , no Crohn's, minimal scar tissue at surgery site Past Surgical History: 
   Reviewed history from 2016 and no changes required: DaVinci colon partial resection  (Dr. Andrei Arzola) [id number] Low Transverse  Section Male Dr. Nella Ellis Family History Summary:  
   Reviewed history Last on 2018 and no changes required:2018 General Comments - FH: 
Family history transferred to [facility] - ASIM compliant Social History: 
   Reviewed history from 2018 and no changes required: 
       Graduated from [employer] 2012  
      self employed  Review of Systems See HPI Except as noted in the HPI, the review of systems is negative for General, Breast, , Resp, GI, Endo, MS, Psych and Heme. Allergies NSAIDS (Moderate) Medications Removed from Medication List 
 
 
 
[address] for Follow-up Visit Estimated weeks of 
      gestation:  41 1/7 Weight:  117.8 Blood pressure: 120 / 80 Vital Signs Blood Pressure: 120 / 80 Height:   58.25 inches Weight:  117.8 pounds Physical Exam  
 
General  
        General appearance:  no acute distress Head Inspection:   normal 
 
Eyes External:   EOM intact Psych Orientation:  oriented to time, place, and person Mood:  no appearance of anxiety, depression, or agitation Abdomen Abdomen:  gravid Impression & Recommendations: 
 
Problem # 1:  Post dates (QED-703.30) (VBZ50-F85.0) BPP 10/10, however DENNIS 5, and old looking placenta, so delivery recommnded by MFM and myself - pt counseled on option of RCS (recommended given unfavorable cervix, post-dates, low fluid) vs TOLAC (with lower chance of success given above factors). - she agrees to CS - risks of bleeding/transfusion, infection, injury to surrounding organs needing further surgery for repair. We also discussed that after 2 CS we recommend further deliveries be CS. She plans more children and we reviewed that risks increase after 4 CS. - questions are answered and she desires to proceed. Problem # 2:  HSV-Valtrex @ 36 wks 18 (UXW-685.36) (BYX06-C22.175) Problem # 3:  Prev LTCS (nrfs) desires  consent signed _plan repeat at 41wks_ (FST-911.58) (QBA69-R59.211) Medications (at conclusion of this visit) 2018 VALACYCLOVIR  MG ORAL TABLET (VALACYCLOVIR HCL) 1 po bid 2018 ALIVE PRENATAL 0.4-25 MG ORAL TABLET CHEWABLE (PRENATAL MV & MIN W/FA-DHA) LABORATORY DATA TEST DATE RESULT Group B Strep culture 2018 GBS Urine                                   (Group B Strep Culture Result Field) Blood Type 2018 O                                             (Blood Type Result Field) Rh 2018 Positive                                   (Rh Result Field) Rhogam Inj Given 2016 * Tdap Vaccine Given 2018 declined Antibody Screen 2018 Negative Rubella [facility] Reference Ranges On or After 3/10/14             
    <0.90              Non-immune 0.90 - 0.99     Equivocal 
    >0.99              Immune [address] Before 3/10/14 <5                 Non-immune 5 - 9               Equivocal     
      >9                 Immune [facility] < Or = 0.90       Negative        
    0.91-1.09          Equivocal       
    > Or = 1.10       Positive   04/13/2018 
 
 2.45 
  
TPA (T Pallidum Antibodies) 06/13/2018 Negative Serology (RPR) 08/23/2016 * HBsAg 04/13/2018 Negative HIV 04/13/2018 Non Reactive Hemoglobin 06/13/2018 10.8 Hematocrit 06/13/2018 32.7 Platelets 35/00/3649 220 X10E3/UL  
TSH 08/23/2016 * Urine Culture 01/12/2018 GBS  
GC DNA Probe 01/12/2018 Negative Chlamydia DNA 01/12/2018 Negative PAP 10/28/2015 NIL Flu Vaccine Given 04/13/2018 declined HGBA1C 08/23/2016 * HGB Electro 12/17/2015 AA  
T4, Free 08/23/2016 * BG Fasting 08/23/2016 * GTT 1H 50G 06/13/2018 94 GTT 1H 100G 08/23/2016 * GTT 2H 100G 08/23/2016 * GTT 3H 100G 08/23/2016 * Glucose Plasma 08/23/2016 * CF Accept or Decline 04/13/2018 Declined CF Screen Result 04/13/2018 Declined Nuchal Trans 04/13/2018 Too Late  
AFP Only 04/13/2018 Declined Tetra 04/13/2018 Declined AFP Serum 08/23/2016 * CVS 08/23/2016 * AFP Amniotic 08/23/2016 * Amnio Karyo 08/23/2016 * FISH 08/23/2016 * GC Culture 08/23/2016 * Chlamydia Cult 08/23/2016 * Ureaplasma Mycoplasma WBC 04/13/2018 11.3 X10E3/UL  
RBC 04/13/2018 3.75 X10E6/UL  
MCV 04/13/2018 [address] 04/13/2018 31.5 MCHC RBC 04/13/2018 32.8 ULTRASOUND DATA TEST DATE RESULT Estimated Fetal Weight 08/02/2018 2543.17521848^2544 g&grams Weight % 08/02/2018 17^17% %&percent DENNIS 09/05/2018 5.2^5.2 cm&centimeters BPP 09/05/2018 8^8 [n/a]&Not applicable Cervical Length (mm) Electronically signed by Stewart Bari  MD on 09/05/2018 at 5:04 PM 
 
________________________________________________________________________

## 2018-09-05 NOTE — IP AVS SNAPSHOT
Summary of Care Report The Summary of Care report has been created to help improve care coordination. Users with access to [employer] or [address] (Web-based application) may access additional patient information including the Discharge Summary. If you are not currently a [address] user and need more information, please call the number listed below in the [place] [address] section and ask to be connected with Medical Records. Facility Information Name Address Phone [phone] [phone] Patient Information Patient Name Sex  Dottie Friend ([id number]) Female 1990 Discharge Information Admitting Provider Service Area Unit  
 Mary Jane Vásquez MD /  [facility] [place] [facility] 3w Mother Infant / [phone] Discharge Provider Discharge Date/Time Discharge Disposition Destination (none) 2018 (Pending) AHR (none) Patient Language Language ENGLISH [13] Hospital Problems as of 2018  Reviewed: 10/9/2017  2:22 PM by Hari Meade MD  
  
  
  
 Class Noted - Resolved Last Modified POA Active Problems Oligohydramnios  2018 - Present 2018 by Mary Jane Vásquez MD Unknown Entered by Mary Jane Vásquez MD  
  
Non-Hospital Problems as of 2018  Reviewed: 10/9/2017  2:22 PM by Hari Meade MD  
  
  
  
 Class Noted - Resolved Last Modified Active Problems   Acne  Unknown - Present 2012 by Frederick Gabriel MD  
  Entered by Frederick Gabriel MD  
  Eczema  Unknown - Present 2012 by Frederick Gabriel MD  
  Entered by Frederick Gabriel MD  
  Seasonal allergic rhinitis  Unknown - Present 2012 by Frederick Gabriel MD  
  Entered by Frederick Gabriel MD  
  Gastritis  2012 - Present 2012 by Frederick Gabriel MD  
 Entered by Ilya Herrera MD  
  Overview Signed 2012  7:30 AM by Ilya Herrera MD  
   EGD (), GI- Dr Yobani Keller Crohn disease ([facility]  8/10/2012 - Present 2013 by Ilya Herrera MD  
  Entered by Ilya Herrera MD  
  Overview Signed 2013  1:39 PM by Ilya Herrera MD  
   S/p R colectomy (13) You are allergic to the following Allergen Reactions Chocolate Flavor Hives Swelling Facial swelling Current Discharge Medication List  
  
START taking these medications Dose & Instructions Dispensing Information Comments  
 oxyCODONE-acetaminophen 5-325 mg per tablet Commonly known as:  PERCOCET Dose:  1 Tab Take 1 Tab by mouth every four (4) hours as needed. Max Daily Amount: 6 Tabs. Quantity:  20 Tab Refills:  0 CONTINUE these medications which have NOT CHANGED Dose & Instructions Dispensing Information Comments -folic-omega-3-fish oil 400-32.5 mcg-mg Cleavon Hammers Dose:  1 Tab Take 1 Tab by mouth daily. Quantity:  30 Tab Refills:  4 Surgery Information ID Date/Time Status Primary Surgeon All Procedures [id number] [facility]  SECTION [facility] L[facility]D OR    
  SECTION:  [address] Follow-up Information Follow up With Details Comments Contact Info Josh Atkins MD   [address] [place] [address] [address] 
[phone] Discharge Instructions POSTPARTUM DISCHARGE INSTRUCTIONS Name:  Laina Allan 
YOB: 1990 Admission Diagnosis:  POST TERM Oligohydramnios Discharge Diagnosis:   
Problem List as of 2018  Date Reviewed: 10/9/2017 Codes Class Noted - Resolved Oligohydramnios ICD-10-CM: O41.00X0 ICD-9-CM: 658.00  2018 - Present Crohn disease ([facility] ICD-10-CM: K50.90 ICD-9-CM: 555.9  8/10/2012 - Present Overview Signed 2013  1:39 PM by Megan Ybarra MD  
  S/p R colectomy (13) Gastritis ICD-10-CM: K29.70 ICD-9-CM: 535.50  2012 - Present Overview Signed 2012  7:30 AM by Megan Ybarra MD  
  EGD (), GI- Dr Amadeo Hartman Acne ICD-10-CM: L70.9 ICD-9-CM: 706.1  Unknown - Present Eczema ICD-10-CM: L30.9 ICD-9-CM: 692.9  Unknown - Present Seasonal allergic rhinitis ICD-10-CM: J30.2 ICD-9-CM: 477.9  Unknown - Present Attending Physician:  Britta Mcmanus MD 
 
Delivery Type:   Section: What to Expect at [facility] Your Recovery: A  section, or , is surgery to deliver your baby through a cut, called an incision that the doctor makes in your lower belly and uterus. You may have some pain in your lower belly and need pain medicine for 1 to 2 weeks. You can expect some vaginal bleeding for several weeks. You will probably need about 6 weeks to fully recover. It is important to take it easy while the incision is healing. Avoid heavy lifting, strenuous activities, or exercises that strain the belly muscles while you are recovering. Ask a family member or friend for help with housework, cooking, and shopping. This care sheet gives you a general idea about how long it will take for you to recover. But each person recovers at a different pace. Follow the steps below to get better as quickly as possible. How can you care for yourself at home? Activity · Rest when you feel tired. Getting enough sleep will help you recover. · Try to walk each day. Start by walking a little more than you did the day before. Bit by bit, increase the amount you walk. Walking boosts blood flow and helps prevent pneumonia, constipation, and blood clots. · Avoid strenuous activities, such as bicycle riding, jogging, weightlifting, and aerobic exercise, for 6 weeks or until your doctor says it is okay. · Until your doctor says it is okay, do not lift anything heavier than your baby. · Do not do sit-ups or other exercise that strain the belly muscles for 6 weeks or until your doctor says it is okay. · Hold a pillow over your incision when you cough or take deep breaths. This will support your belly and decrease your pain. · You may shower as usual. Pat the incision dry when you are done. · You will have some vaginal bleeding. Wear sanitary pads. Do not douche or use tampons until your doctor says it is okay. · Ask your doctor when you can drive again. · You will probably need to take at least 6 weeks off work. It depends on the type of work you do and how you feel. · Wait until you are healed (about 4 to 6 weeks) before you have sexual intercourse. Your doctor will tell you when it is okay to have sex. · Talk to your doctor about birth control. You can get pregnant even before your period returns. Also, you can get pregnant while you are breast-feeding. Incision care Your skin is your body's first line of defense against germs, but an incision site leaves an easy way for germs to enter your body. To prevent infection: · Clean your hands frequently and before and after changing any touching any dressings. · If you have strips of tape on the incision, leave the tape on for a week or until it falls off. · Look at your incision closely every day for any changes. Contact your doctor if you experience any signs of infection, such as fever or increased redness at the surgical site. · Wash the area daily with warm, soapy water, and pat it dry. Don't use hydrogen peroxide or alcohol, which can slow healing. You may cover the area with a gauze bandage if it weeps or rubs against clothing. Change the bandage every day. · Keep the area clean and dry. Diet · You can eat your normal diet. If your stomach is upset, try bland, low-fat foods like plain rice, broiled chicken, toast, and yogurt. · Drink plenty of fluids (unless your doctor tells you not to). · You may notice that your bowel movements are not regular right after your surgery. This is common. Try to avoid constipation and straining with bowel movements. You may want to take a fiber supplement every day. If you have not had a bowel movement after a couple of days, ask your doctor about taking a mild laxative. · If you are breast-feeding, do not drink any alcohol. Medicines · Take pain medicines exactly as directed. · If the doctor gave you a prescription medicine for pain, take it as prescribed. · If you are not taking a prescription pain medicine, ask your doctor if you can take an over-the-counter medicine such as acetaminophen (Tylenol), ibuprofen (Advil, Motrin), or naproxen (Aleve), for cramps. Read and follow all instructions on the label. Do not take aspirin, because it can cause more bleeding. Do not take acetaminophen (Tylenol) and other acetaminophen containing medications (i.e. Percocet) at the same time. · If you think your pain medicine is making you sick to your stomach: 
· Take your medicine after meals (unless your doctor has told you not to). · Ask your doctor for a different pain medicine. · If your doctor prescribed antibiotics, take them as directed. Do not stop taking them just because you feel better. You need to take the full course of antibiotics. Mental Health · Many women get the \"baby blues\" during the first few days after childbirth. You may lose sleep, feel irritable, and cry easily. You may feel happy one minute and sad the next. Hormone changes are one cause of these emotional changes. Also, the demands of a new baby, along with visits from relatives or other family needs, add to a mother's stress. The \"baby blues\" often peak around the fourth day. Then they ease up in less than 2 weeks.  
· If your moodiness or anxiety lasts for more than 2 weeks, or if you feel like life is not worth living, you may have postpartum depression. This is different for each mother. Some mothers with serious depression may worry intensely about their infant's well-being. Others may feel distant from their child. Some mothers might even feel that they might harm their baby. A mother may have signs of paranoia, wondering if someone is watching her. · With all the changes in your life, you may not know if you are depressed. Pregnancy sometimes causes changes in how you feel that are similar to the symptoms of depression. · Symptoms of depression include: · Feeling sad or hopeless and losing interest in daily activities. These are the most common symptoms of depression. · Sleeping too much or not enough. · Feeling tired. You may feel as if you have no energy. · Eating too much or too little. · POSTPARTUM SUPPORT INTERNATIONAL (PSI) offers a Warm line; Chat with the Expert phone sessions; Information and Articles about Pregnancy and Postpartum Mood Disorders; Comprehensive List of Free Support Groups; Knowledgeable local coordinators who will offer support, information, and resources; Guide to Resources on [employer]; Calendar of events in the  mood disorders community; Latest News and Research; and [facility] [address] for [employer]. Remember - You are not alone; You are not to blame; With help, you will be well. 2-107-241-PPD(2501). WWW. POSTPARTUM. NET · Writing or talking about death, such as writing suicide notes or talking about guns, knives, or pills. Keep the numbers for these national suicide hotlines: 6-847-046-TALK (1-281.519.1528) and 6-039-GZIHUFQ (6-928.134.8408). If you or someone you know talks about suicide or feeling hopeless, get help right away. Other instructions · If you breast-feed your baby, you may be more comfortable while you are healing if you place the baby so that he or she is not resting on your belly. Try tucking your baby under your arm, with his or her body along the side you will be feeding on. Support your baby's upper body with your arm. With that hand you can control your baby's head to bring his or her mouth to your breast. This is sometimes called the football hold. Follow-up care is a key part of your treatment and safety. Be sure to make and go to all appointments, and call your doctor if you are having problems. It's also a good idea to know your test results and keep a list of the medicines you take. When should you call for help? Call 911 anytime you think you may need emergency care. For example, call if: 
· You are thinking of hurting yourself, your baby, or anyone else. · You passed out (lost consciousness). · You have symptoms of a blood clot in your lung (called a pulmonary embolism). These may include: 
· Sudden chest pain. · Trouble breathing. · Coughing up blood. Call your doctor now or seek immediate medical care if: 
 
· You have severe vaginal bleeding. · You are soaking through a pad each hour for 2 or more hours. · Your vaginal bleeding seems to be getting heavier or is still bright red 4 days after delivery. · You are dizzy or lightheaded, or you feel like you may faint. · You are vomiting or cannot keep fluids down. · You have a fever. · You have new or more belly pain. · You have loose stitches, or your incision comes open. · You have symptoms of infection, such as: 
· Increased pain, swelling, warmth, or redness. · Red streaks leading from the incision. · Pus draining from the incision. · A fever · You pass tissue (not just blood). · Your vaginal discharge smells bad. · Your belly feels tender or full and hard. · Your breasts are continuously painful or red. · You feel sad, anxious, or hopeless for more than a few days. · You have sudden, severe pain in your belly.  
· You have symptoms of a blood clot in your leg (called a deep vein thrombosis), such as: 
· Pain in your calf, back of the knee, thigh, or groin. · Redness and swelling in your leg or groin. · You have symptoms of preeclampsia, such as: 
· Sudden swelling of your face, hands, or feet. · New vision problems (such as dimness or blurring). · A severe headache. · Your blood pressure is higher than it should be or rises suddenly. · You have new nausea or vomiting. Watch closely for changes in your health, and be sure to contact your doctor if you have any problems. Additional Information:  Postpartum Support PARENTS:  Are you feeling sad or depressed? Is it difficult for you to enjoy yourself? Do you feel more irritable or tense? Do you feel anxious or panicky? Are you having difficulty bonding with your baby? Do you feel as if you are \"out of control\" or \"going crazy\"? Are you worried that you might hurt your baby or yourself? FAMILIES: Do you worry that something is wrong but don't know how to help? Do you think that your partner or spouse is having problems coping? Are you worried that it may never get better? While many women experience some mild mood change or \"the blues\" during or after the birth of a child, 1 in 9 women experience more significant symptoms of depression or anxiety. 1 in 10 Dads become depressed during the first year. Things you can do Being a good parent includes taking care of yourself. If you take care of yourself, you will be able to take better care of your baby and your family. · Talk to a counselor or healthcare provider who has training in  mood and anxiety problems. · Learn as much as you can about pregnancy and postpartum depression and anxiety. · Get support from family and friends. Ask for help when you need it. · Join a support group in your area or online. · Keep active by walking, stretching or whatever form of exercise helps you to feel better. · Get enough rest and time for yourself. · Eat a healthy diet. · Don't give up! It may take more than one try to get the right help you need. These are general instructions for a healthy lifestyle: No smoking/ No tobacco products/ Avoid exposure to second hand smoke Surgeon General's Warning:  Quitting smoking now greatly reduces serious risk to your health. Obesity, smoking, and sedentary lifestyle greatly increases your risk for illness A healthy diet, regular physical exercise & weight monitoring are important for maintaining a healthy lifestyle Recognize signs and symptoms of STROKE: 
 
F-face looks uneven A-arms unable to move or move unevenly S-speech slurred or non-existent T-time-call 911 as soon as signs and symptoms begin - DO NOT go  
    back to bed or wait to see if you get better - TIME IS BRAIN. I have had the opportunity to make my options or choices for discharge. I have received and understand these instructions. Chart Review Routing History Recipient Method Report Sent By Brain Edilma Doshi MD  
Fax: [phone] Phone: [phone] Fax IP Auto Routed Charisse Maguire [phone] 1/8/2013  3:31 PM 01/08/2013 Riley Doshi MD  
Fax: [phone] Phone: [phone] Fax IP Auto Routed Charisse Maguire -[phone] 2/12/2013  7:34 AM 02/12/2013 Kuldip Galvan MD  
Phone: [phone] In Basket IP Auto Routed Notes Jean Pierre Camp MD [[id number]] 9/5/2018  5:05 PM 09/05/2018 Kuldip Galvan MD  
Phone: [phone] In Basket IP Auto Routed Notes Azalia Mejia MD [[id number]] 9/8/2018  8:08 AM 09/08/2018

## 2018-09-05 NOTE — PROGRESS NOTES
162: Patient arrived from office with DENNIS of 5, on schedule for  Section tomorrow. Placed on monitors in LD room 5 and assessing now. Patient states positive fetal movement, denies vaginal bleeding, denies signs of SROM. : Dr. Debby Recinos at bedside to talk to patient. Patient desires  Section today.   
 
: Patient walking to OR 1

## 2018-09-05 NOTE — ANESTHESIA PROCEDURE NOTES
Spinal Block Start time: 9/5/2018 7:30 PM 
End time: 9/5/2018 7:34 PM 
Performed by: Rosi Hernandez Authorized by: Rosi Hernandez  
 
Pre-procedure: Indications: primary anesthetic  Preanesthetic Checklist: patient identified, risks and benefits discussed, anesthesia consent, site marked, patient being monitored and timeout performed Timeout Time: 19:30 Spinal Block:  
Patient Position:  Seated Prep Region:  Lumbar Prep: DuraPrep and patient draped Location:  L3-4 Technique:  Single shot Local:  Lidocaine 1% Needle:  
Needle Type:  Pencil-tip Needle Gauge:  25 G Attempts:  1 Events: CSF confirmed, no blood with aspiration and no paresthesia Assessment: 
Insertion:  Uncomplicated Patient tolerance:  Patient tolerated the procedure well with no immediate complications

## 2018-09-05 NOTE — PROGRESS NOTES
1945- Bedside, Verbal and Written shift change report given to Mitzy Webber, OLE (oncoming nurse) by MP Nixon RN (offgoing nurse). Report included the following information SBAR, Kardex and Recent Results. 2024- called wei. Plan to give phenergan for vomiting. 
 
2325- TRANSFER - OUT REPORT: 
 
Verbal report given to JOHNATHAN Douglas RN (name) on [facility]  being transferred to (unit) for routine progression of care Report consisted of patients Situation, Background, Assessment and  
Recommendations(SBAR). Information from the following report(s) SBAR, Kardex, Intake/Output and MAR was reviewed with the receiving nurse. Lines:  
Peripheral IV 09/05/18 Left Arm (Active) Site Assessment Clean, dry, & intact 9/5/2018  5:31 PM  
Phlebitis Assessment 0 9/5/2018  5:31 PM  
Infiltration Assessment 0 9/5/2018  5:31 PM  
Dressing Status Clean, dry, & intact 9/5/2018  5:31 PM  
Dressing Type Transparent;Tape 9/5/2018  5:31 PM  
Hub Color/Line Status Pink; Infusing 9/5/2018  5:31 PM  
  
 
Opportunity for questions and clarification was provided. Patient transported with: 
 Registered Nurse

## 2018-09-05 NOTE — IP AVS SNAPSHOT
110 [facility] [address] [place] [address] 
[phone] Patient: Delfina Palumbo 
MRN: [id number] LIS:[id number] About your hospitalization You were admitted on:  September 5, 2018 You last received care in the:  [address] [facility] You were discharged on:  September 8, 2018 Why you were hospitalized Your primary diagnosis was:  Not on File Your diagnoses also included:  Oligohydramnios Follow-up Information Follow up With Details Comments Contact Info Baldev Dee MD   [address] [place] [address] [address] [place] [address] 
[phone] Discharge Orders None A check marcie indicates which time of day the medication should be taken. My Medications START taking these medications Instructions Each Dose to Equal  
 Morning Noon Evening Bedtime  
 oxyCODONE-acetaminophen 5-325 mg per tablet Commonly known as:  PERCOCET Your last dose was: Your next dose is: Take 1 Tab by mouth every four (4) hours as needed. Max Daily Amount: 6 Tabs. 1 Tab CONTINUE taking these medications Instructions Each Dose to Equal  
 Morning Noon Evening Bedtime -folic-omega-3-fish oil 400-32.5 mcg-mg [facility] Your last dose was: Your next dose is: Take 1 Tab by mouth daily. 1 Tab Where to Get Your Medications Information on where to get these meds will be given to you by the nurse or doctor. ! Ask your nurse or doctor about these medications  
  oxyCODONE-acetaminophen 5-325 mg per tablet Opioid Education Prescription Opioids: What You Need to Know: 
 
Prescription opioids can be used to help relieve moderate-to-severe pain and are often prescribed following a surgery or injury, or for certain health conditions.   These medications can be an important part of treatment but also come with serious risks. Opioids are strong pain medicines. Examples include hydrocodone, oxycodone, fentanyl, and morphine. Heroin is an example of an illegal opioid. It is important to work with your health care provider to make sure you are getting the safest, most effective care. WHAT ARE THE RISKS AND SIDE EFFECTS OF OPIOID USE? Prescription opioids carry serious risks of addiction and overdose, especially with prolonged use. An opioid overdose, often marked by slow breathing, can cause sudden death. The use of prescription opioids can have a number of side effects as well, even when taken as directed. · Tolerance-meaning you might need to take more of a medication for the same pain relief · Physical dependence-meaning you have symptoms of withdrawal when the medication is stopped. Withdrawal symptoms can include nausea, sweating, chills, diarrhea, stomach cramps, and muscle aches. Withdrawal can last up to several weeks, depending on which drug you took and how long you took it. · Increased sensitivity to pain · Constipation · Nausea, vomiting, and dry mouth · Sleepiness and dizziness · Confusion · Depression · Low levels of testosterone that can result in lower sex drive, energy, and strength · Itching and sweating RISKS ARE GREATER WITH:      
· History of drug misuse, substance use disorder, or overdose · Mental health conditions (such as depression or anxiety) · Sleep apnea · Older age (72 years or older) · Pregnancy Avoid alcohol while taking prescription opioids. Also, unless specifically advised by your health care provider, medications to avoid include: · Benzodiazepines (such as Xanax or Valium) · Muscle relaxants (such as Soma or Flexeril) · Hypnotics (such as Ambien or Lunesta) · Other prescription opioids KNOW YOUR OPTIONS Talk to your health care provider about ways to manage your pain that don't involve prescription opioids. Some of these options may actually work better and have fewer risks and side effects. Options may include: 
· Pain relievers such as acetaminophen, ibuprofen, and naproxen · Some medications that are also used for depression or seizures · Physical therapy and exercise · Counseling to help patients learn how to cope better with triggers of pain and stress. · Application of heat or cold compress · Massage therapy · Relaxation techniques Be Informed Make sure you know the name of your medication, how much and how often to take it, and its potential risks & side effects. IF YOU ARE PRESCRIBED OPIOIDS FOR PAIN: 
· Never take opioids in greater amounts or more often than prescribed. Remember the goal is not to be pain-free but to manage your pain at a tolerable level. · Follow up with your primary care provider to: · Work together to create a plan on how to manage your pain. · Talk about ways to help manage your pain that don't involve prescription opioids. · Talk about any and all concerns and side effects. · Help prevent misuse and abuse. · Never sell or share prescription opioids · Help prevent misuse and abuse. · Store prescription opioids in a secure place and out of reach of others (this may include visitors, children, friends, and family). · Safely dispose of unused/unwanted prescription opioids: Find your community drug take-back program or your pharmacy mail-back program, or flush them down the toilet, following guidance from the Food and Drug Administration (www.fda.gov/Drugs/ResourcesForYou). · Visit www.cdc.gov/drugoverdose to learn about the risks of opioid abuse and overdose. · If you believe you may be struggling with addiction, tell your health care provider and ask for guidance or call [address] [place], [place] [address] at 7-680-228-DQMH. Discharge Instructions POSTPARTUM DISCHARGE INSTRUCTIONS Name:  Sidra Daniels 
YOB: 1990 Admission Diagnosis:  POST TERM Oligohydramnios Discharge Diagnosis:   
Problem List as of 2018  Date Reviewed: 10/9/2017 Codes Class Noted - Resolved Oligohydramnios ICD-10-CM: O41.00X0 ICD-9-CM: 658.00  2018 - Present Crohn disease ([facility].) ICD-10-CM: K50.90 ICD-9-CM: 555.9  8/10/2012 - Present Overview Signed 2013  1:39 PM by Orville Bosworth, MD  
  S/p R colectomy (13) Gastritis ICD-10-CM: K29.70 ICD-9-CM: 535.50  2012 - Present Overview Signed 2012  7:30 AM by Orville Bosworth, MD  
  EGD (), GI- Dr Payton Gonsalez Acne ICD-10-CM: L70.9 ICD-9-CM: 706.1  Unknown - Present Eczema ICD-10-CM: L30.9 ICD-9-CM: 692.9  Unknown - Present Seasonal allergic rhinitis ICD-10-CM: J30.2 ICD-9-CM: 477.9  Unknown - Present Attending Physician:  Thania Dooley MD 
 
Delivery Type:   Section: What to Expect at [facility] Your Recovery: A  section, or , is surgery to deliver your baby through a cut, called an incision that the doctor makes in your lower belly and uterus. You may have some pain in your lower belly and need pain medicine for 1 to 2 weeks. You can expect some vaginal bleeding for several weeks. You will probably need about 6 weeks to fully recover. It is important to take it easy while the incision is healing. Avoid heavy lifting, strenuous activities, or exercises that strain the belly muscles while you are recovering. Ask a family member or friend for help with housework, cooking, and shopping. This care sheet gives you a general idea about how long it will take for you to recover. But each person recovers at a different pace. Follow the steps below to get better as quickly as possible. How can you care for yourself at home? Activity · Rest when you feel tired. Getting enough sleep will help you recover. · Try to walk each day. Start by walking a little more than you did the day before. Bit by bit, increase the amount you walk. Walking boosts blood flow and helps prevent pneumonia, constipation, and blood clots. · Avoid strenuous activities, such as bicycle riding, jogging, weightlifting, and aerobic exercise, for 6 weeks or until your doctor says it is okay. · Until your doctor says it is okay, do not lift anything heavier than your baby. · Do not do sit-ups or other exercise that strain the belly muscles for 6 weeks or until your doctor says it is okay. · Hold a pillow over your incision when you cough or take deep breaths. This will support your belly and decrease your pain. · You may shower as usual. Pat the incision dry when you are done. · You will have some vaginal bleeding. Wear sanitary pads. Do not douche or use tampons until your doctor says it is okay. · Ask your doctor when you can drive again. · You will probably need to take at least 6 weeks off work. It depends on the type of work you do and how you feel. · Wait until you are healed (about 4 to 6 weeks) before you have sexual intercourse. Your doctor will tell you when it is okay to have sex. · Talk to your doctor about birth control. You can get pregnant even before your period returns. Also, you can get pregnant while you are breast-feeding. Incision care Your skin is your body's first line of defense against germs, but an incision site leaves an easy way for germs to enter your body. To prevent infection: · Clean your hands frequently and before and after changing any touching any dressings. · If you have strips of tape on the incision, leave the tape on for a week or until it falls off. · Look at your incision closely every day for any changes.  Contact your doctor if you experience any signs of infection, such as fever or increased redness at the surgical site. · Wash the area daily with warm, soapy water, and pat it dry. Don't use hydrogen peroxide or alcohol, which can slow healing. You may cover the area with a gauze bandage if it weeps or rubs against clothing. Change the bandage every day. · Keep the area clean and dry. Diet · You can eat your normal diet. If your stomach is upset, try bland, low-fat foods like plain rice, broiled chicken, toast, and yogurt. · Drink plenty of fluids (unless your doctor tells you not to). · You may notice that your bowel movements are not regular right after your surgery. This is common. Try to avoid constipation and straining with bowel movements. You may want to take a fiber supplement every day. If you have not had a bowel movement after a couple of days, ask your doctor about taking a mild laxative. · If you are breast-feeding, do not drink any alcohol. Medicines · Take pain medicines exactly as directed. · If the doctor gave you a prescription medicine for pain, take it as prescribed. · If you are not taking a prescription pain medicine, ask your doctor if you can take an over-the-counter medicine such as acetaminophen (Tylenol), ibuprofen (Advil, Motrin), or naproxen (Aleve), for cramps. Read and follow all instructions on the label. Do not take aspirin, because it can cause more bleeding. Do not take acetaminophen (Tylenol) and other acetaminophen containing medications (i.e. Percocet) at the same time. · If you think your pain medicine is making you sick to your stomach: 
· Take your medicine after meals (unless your doctor has told you not to). · Ask your doctor for a different pain medicine. · If your doctor prescribed antibiotics, take them as directed. Do not stop taking them just because you feel better. You need to take the full course of antibiotics. Mental Health · Many women get the \"baby blues\" during the first few days after childbirth. You may lose sleep, feel irritable, and cry easily. You may feel happy one minute and sad the next. Hormone changes are one cause of these emotional changes. Also, the demands of a new baby, along with visits from relatives or other family needs, add to a mother's stress. The \"baby blues\" often peak around the fourth day. Then they ease up in less than 2 weeks. · If your moodiness or anxiety lasts for more than 2 weeks, or if you feel like life is not worth living, you may have postpartum depression. This is different for each mother. Some mothers with serious depression may worry intensely about their infant's well-being. Others may feel distant from their child. Some mothers might even feel that they might harm their baby. A mother may have signs of paranoia, wondering if someone is watching her. · With all the changes in your life, you may not know if you are depressed. Pregnancy sometimes causes changes in how you feel that are similar to the symptoms of depression. · Symptoms of depression include: · Feeling sad or hopeless and losing interest in daily activities. These are the most common symptoms of depression. · Sleeping too much or not enough. · Feeling tired. You may feel as if you have no energy. · Eating too much or too little. · POSTPARTUM SUPPORT INTERNATIONAL (PSI) offers a Warm line; Chat with the Expert phone sessions; Information and Articles about Pregnancy and Postpartum Mood Disorders; Comprehensive List of Free Support Groups; Knowledgeable local coordinators who will offer support, information, and resources; Guide to Resources on PTC Therapeutics; Calendar of events in the  mood disorders community; Latest News and Research; and [facility] [address] for [employer]. Remember - You are not alone; You are not to blame; With help, you will be well. 7-381-239-PPD(8809). WWW. POSTPARTUM. NET  
 · Writing or talking about death, such as writing suicide notes or talking about guns, knives, or pills. Keep the numbers for these national suicide hotlines: 8-496-812-TALK (2-175.582.9104) and 4-638-TUUJJVX (3-775.339.7158). If you or someone you know talks about suicide or feeling hopeless, get help right away. Other instructions · If you breast-feed your baby, you may be more comfortable while you are healing if you place the baby so that he or she is not resting on your belly. Try tucking your baby under your arm, with his or her body along the side you will be feeding on. Support your baby's upper body with your arm. With that hand you can control your baby's head to bring his or her mouth to your breast. This is sometimes called the football hold. Follow-up care is a key part of your treatment and safety. Be sure to make and go to all appointments, and call your doctor if you are having problems. It's also a good idea to know your test results and keep a list of the medicines you take. When should you call for help? Call 911 anytime you think you may need emergency care. For example, call if: 
· You are thinking of hurting yourself, your baby, or anyone else. · You passed out (lost consciousness). · You have symptoms of a blood clot in your lung (called a pulmonary embolism). These may include: 
· Sudden chest pain. · Trouble breathing. · Coughing up blood. Call your doctor now or seek immediate medical care if: 
 
· You have severe vaginal bleeding. · You are soaking through a pad each hour for 2 or more hours. · Your vaginal bleeding seems to be getting heavier or is still bright red 4 days after delivery. · You are dizzy or lightheaded, or you feel like you may faint. · You are vomiting or cannot keep fluids down. · You have a fever. · You have new or more belly pain. · You have loose stitches, or your incision comes open. · You have symptoms of infection, such as: · Increased pain, swelling, warmth, or redness. · Red streaks leading from the incision. · Pus draining from the incision. · A fever · You pass tissue (not just blood). · Your vaginal discharge smells bad. · Your belly feels tender or full and hard. · Your breasts are continuously painful or red. · You feel sad, anxious, or hopeless for more than a few days. · You have sudden, severe pain in your belly. · You have symptoms of a blood clot in your leg (called a deep vein thrombosis), such as: 
· Pain in your calf, back of the knee, thigh, or groin. · Redness and swelling in your leg or groin. · You have symptoms of preeclampsia, such as: 
· Sudden swelling of your face, hands, or feet. · New vision problems (such as dimness or blurring). · A severe headache. · Your blood pressure is higher than it should be or rises suddenly. · You have new nausea or vomiting. Watch closely for changes in your health, and be sure to contact your doctor if you have any problems. Additional Information:  Postpartum Support PARENTS:  Are you feeling sad or depressed? Is it difficult for you to enjoy yourself? Do you feel more irritable or tense? Do you feel anxious or panicky? Are you having difficulty bonding with your baby? Do you feel as if you are \"out of control\" or \"going crazy\"? Are you worried that you might hurt your baby or yourself? FAMILIES: Do you worry that something is wrong but don't know how to help? Do you think that your partner or spouse is having problems coping? Are you worried that it may never get better? While many women experience some mild mood change or \"the blues\" during or after the birth of a child, 1 in 9 women experience more significant symptoms of depression or anxiety. 1 in 10 Dads become depressed during the first year. Things you can do Being a good parent includes taking care of yourself.   If you take care of yourself, you will be able to take better care of your baby and your family. · Talk to a counselor or healthcare provider who has training in  mood and anxiety problems. · Learn as much as you can about pregnancy and postpartum depression and anxiety. · Get support from family and friends. Ask for help when you need it. · Join a support group in your area or online. · Keep active by walking, stretching or whatever form of exercise helps you to feel better. · Get enough rest and time for yourself. · Eat a healthy diet. · Don't give up! It may take more than one try to get the right help you need. These are general instructions for a healthy lifestyle: No smoking/ No tobacco products/ Avoid exposure to second hand smoke Surgeon General's Warning:  Quitting smoking now greatly reduces serious risk to your health. Obesity, smoking, and sedentary lifestyle greatly increases your risk for illness A healthy diet, regular physical exercise & weight monitoring are important for maintaining a healthy lifestyle Recognize signs and symptoms of STROKE: 
 
F-face looks uneven A-arms unable to move or move unevenly S-speech slurred or non-existent T-time-call 911 as soon as signs and symptoms begin - DO NOT go  
    back to bed or wait to see if you get better - TIME IS BRAIN. I have had the opportunity to make my options or choices for discharge. I have received and understand these instructions. [employer] Announcement We are excited to announce that we are making your provider's discharge notes available to you in [employer]. You will see these notes when they are completed and signed by the physician that discharged you from your recent hospital stay.   If you have any questions or concerns about any information you see in [employer], please call the Health Information Department where you were seen or reach out to your Primary Care Provider for more information about your plan of care. Introducing [facility]! Dear Malia Wong: 
Thank you for requesting a Cook[employer] account. Our records indicate that you already have an active [email or web address] account. You can access your account anytime at [email or web address] Did you know that you can access your hospital and ER discharge instructions at any time in Cook123? You can also review all of your test results from your hospital stay or ER visit. Additional Information If you have questions, please visit the Frequently Asked Questions section of the [email or web address] website at [email or web address] Remember, Cook123 is NOT to be used for urgent needs. For medical emergencies, dial 911. Now available from your iPhone and Android! Introducing Ezra Dumas As a [facility] patient, I wanted to make you aware of our electronic visit tool called Ezra Dumas. [facility] 24/7 allows you to connect within minutes with a medical provider 24 hours a day, seven days a week via a mobile device or tablet or logging into a secure website from your computer. You can access Ezra Dumas from anywhere in the [place]. A virtual visit might be right for you when you have a simple condition and feel like you just dont want to get out of bed, or cant get away from work for an appointment, when your regular [facility] provider is not available (evenings, weekends or holidays), or when youre out of town and need minor care. Electronic visits cost only $49 and if the [facility] 24/7 provider determines a prescription is needed to treat your condition, one can be electronically transmitted to a nearby pharmacy*. Please take a moment to enroll today if you have not already done so. The enrollment process is free and takes just a few minutes.   To enroll, please download the [employer] Scaler 24/7 wiliam to your tablet or phone, or visit [email or web address] to enroll on your computer. And, as an [address] [place] [address] patient with a [employer] account, the results of your visits will be scanned into your electronic medical record and your primary care provider will be able to view the scanned results. We urge you to continue to see your regular [facility] provider for your ongoing medical care. And while your primary care provider may not be the one available when you seek a On[employer] virtual visit, the peace of mind you get from getting a real diagnosis real time can be priceless. For more information on [employer], view our Frequently Asked Questions (FAQs) at [email or web address]. Sincerely, 
 
Michael Coto MD 
Chief Medical Officer [facility] Babs Flynn *:  certain medications cannot be prescribed via Oncovision Providers Seen During Your Hospitalization Provider Specialty Primary office phone Melanie Dillon MD Obstetrics & Gynecology [phone] Your Primary Care Physician (PCP) Primary Care Physician Office Phone Office Fax Enrrique Gomez [phone] You are allergic to the following Allergen Reactions Chocolate Flavor Hives Swelling Facial swelling Recent Documentation Height Weight Breastfeeding? BMI OB Status Smoking Status 1.499 m 53.1 kg Unknown 23.63 kg/m2 Recent pregnancy Never Smoker Emergency Contacts Name Discharge Info Relation Home Work Mobile Nick 44 CAREGIVER [3] Spouse [3] [phone] Logan Mendez  Parent [1] [phone] Patient Belongings The following personal items are in your possession at time of discharge: 
  Dental Appliances: None  Visual Aid: None      Home Medications: None   Jewelry: With patient  Clothing: At bedside    Other Valuables: With patient, At bedside  Personal Items Sent to Safe: none Please provide this summary of care documentation to your next provider. Signatures-by signing, you are acknowledging that this After Visit Summary has been reviewed with you and you have received a copy. Patient Signature:  ____________________________________________________________ Date:  ____________________________________________________________  
  
Juan Miguel Mais Provider Signature:  ____________________________________________________________ Date:  ____________________________________________________________

## 2018-09-05 NOTE — ANESTHESIA PREPROCEDURE EVALUATION
Anesthetic History No history of anesthetic complications Review of Systems / Medical History Patient summary reviewed, nursing notes reviewed and pertinent labs reviewed Pulmonary Within defined limits Neuro/Psych Within defined limits Cardiovascular Within defined limits GI/Hepatic/Renal 
Within defined limits Endo/Other Within defined limits Other Findings Physical Exam 
 
Airway Mallampati: II 
TM Distance: > 6 cm Neck ROM: normal range of motion Mouth opening: Normal 
 
 Cardiovascular Regular rate and rhythm,  S1 and S2 normal,  no murmur, click, rub, or gallop Dental 
 
Dentition: Upper dentition intact and Lower dentition intact Pulmonary Breath sounds clear to auscultation Abdominal 
GI exam deferred Other Findings Anesthetic Plan ASA: 2 Anesthesia type: spinal 
 
 
Post-op pain plan if not by surgeon: intrathecal opiates Induction: Intravenous Anesthetic plan and risks discussed with: Patient Ready for procedure

## 2018-09-06 ENCOUNTER — ANESTHESIA (OUTPATIENT)
Dept: LABOR AND DELIVERY | Age: 28
End: 2018-09-06

## 2018-09-06 PROBLEM — O41.00X0 OLIGOHYDRAMNIOS: Status: ACTIVE | Noted: 2018-09-06

## 2018-09-06 LAB
BASOPHILS # BLD: 0 K/UL (ref 0–0.1)
BASOPHILS NFR BLD: 0 % (ref 0–1)
DIFFERENTIAL METHOD BLD: ABNORMAL
EOSINOPHIL # BLD: 0 K/UL (ref 0–0.4)
EOSINOPHIL NFR BLD: 0 % (ref 0–7)
ERYTHROCYTE [DISTWIDTH] IN BLOOD BY AUTOMATED COUNT: 14.7 % (ref 11.5–14.5)
HCT VFR BLD AUTO: 30.7 % (ref 35–47)
HGB BLD-MCNC: 9.9 G/DL (ref 11.5–16)
IMM GRANULOCYTES # BLD: 0.1 K/UL (ref 0–0.04)
IMM GRANULOCYTES NFR BLD AUTO: 0 % (ref 0–0.5)
LYMPHOCYTES # BLD: 1.4 K/UL (ref 0.8–3.5)
LYMPHOCYTES NFR BLD: 10 % (ref 12–49)
MCH RBC QN AUTO: 28.9 PG (ref 26–34)
MCHC RBC AUTO-ENTMCNC: 32.2 G/DL (ref 30–36.5)
MCV RBC AUTO: 89.5 FL (ref 80–99)
MONOCYTES # BLD: 0.7 K/UL (ref 0–1)
MONOCYTES NFR BLD: 5 % (ref 5–13)
NEUTS SEG # BLD: 12.6 K/UL (ref 1.8–8)
NEUTS SEG NFR BLD: 85 % (ref 32–75)
NRBC # BLD: 0 K/UL (ref 0–0.01)
NRBC BLD-RTO: 0 PER 100 WBC
PLATELET # BLD AUTO: 151 K/UL (ref 150–400)
PMV BLD AUTO: 11.3 FL (ref 8.9–12.9)
RBC # BLD AUTO: 3.43 M/UL (ref 3.8–5.2)
WBC # BLD AUTO: 14.9 K/UL (ref 3.6–11)

## 2018-09-06 PROCEDURE — 36415 COLL VENOUS BLD VENIPUNCTURE: CPT | Performed by: OBSTETRICS & GYNECOLOGY

## 2018-09-06 PROCEDURE — 74011250637 HC RX REV CODE- 250/637: Performed by: OBSTETRICS & GYNECOLOGY

## 2018-09-06 PROCEDURE — 85025 COMPLETE CBC W/AUTO DIFF WBC: CPT | Performed by: OBSTETRICS & GYNECOLOGY

## 2018-09-06 PROCEDURE — 65410000002 HC RM PRIVATE OB

## 2018-09-06 RX ORDER — ONDANSETRON 2 MG/ML
4 INJECTION INTRAMUSCULAR; INTRAVENOUS
Status: ACTIVE | OUTPATIENT
Start: 2018-09-06 | End: 2018-09-07

## 2018-09-06 RX ORDER — NALOXONE HYDROCHLORIDE 0.4 MG/ML
0.4 INJECTION, SOLUTION INTRAMUSCULAR; INTRAVENOUS; SUBCUTANEOUS AS NEEDED
Status: DISCONTINUED | OUTPATIENT
Start: 2018-09-06 | End: 2018-09-08 | Stop reason: HOSPADM

## 2018-09-06 RX ORDER — NALBUPHINE HYDROCHLORIDE 10 MG/ML
2.5 INJECTION, SOLUTION INTRAMUSCULAR; INTRAVENOUS; SUBCUTANEOUS
Status: ACTIVE | OUTPATIENT
Start: 2018-09-06 | End: 2018-09-07

## 2018-09-06 RX ORDER — IBUPROFEN 400 MG/1
800 TABLET ORAL 3 TIMES DAILY
Status: DISCONTINUED | OUTPATIENT
Start: 2018-09-06 | End: 2018-09-06

## 2018-09-06 RX ORDER — KETOROLAC TROMETHAMINE 30 MG/ML
30 INJECTION, SOLUTION INTRAMUSCULAR; INTRAVENOUS EVERY 6 HOURS
Status: DISCONTINUED | OUTPATIENT
Start: 2018-09-06 | End: 2018-09-06

## 2018-09-06 RX ORDER — MORPHINE SULFATE 10 MG/ML
10 INJECTION, SOLUTION INTRAMUSCULAR; INTRAVENOUS
Status: ACTIVE | OUTPATIENT
Start: 2018-09-06 | End: 2018-09-07

## 2018-09-06 RX ORDER — MORPHINE SULFATE 10 MG/ML
6 INJECTION, SOLUTION INTRAMUSCULAR; INTRAVENOUS
Status: ACTIVE | OUTPATIENT
Start: 2018-09-06 | End: 2018-09-07

## 2018-09-06 RX ORDER — KETOROLAC TROMETHAMINE 30 MG/ML
30 INJECTION, SOLUTION INTRAMUSCULAR; INTRAVENOUS
Status: DISPENSED | OUTPATIENT
Start: 2018-09-06 | End: 2018-09-06

## 2018-09-06 RX ADMIN — ACETAMINOPHEN 650 MG: 325 TABLET ORAL at 17:42

## 2018-09-06 RX ADMIN — ACETAMINOPHEN 650 MG: 325 TABLET ORAL at 21:30

## 2018-09-06 RX ADMIN — DOCUSATE SODIUM 100 MG: 100 CAPSULE, LIQUID FILLED ORAL at 21:30

## 2018-09-06 NOTE — OP NOTES
Operative Note    Name: Albina Stevens Record Number: [id number]   YOB: 1990  Today's Date: 2018      Pre-operative Diagnosis: POST TERM, repeat  section    Post-operative Diagnosis: Same, but delivered    Operation: low transverse  section Procedure(s):   SECTION    Surgeon(s):  Ronold Boyers, MD Cena Frankel, MD- primary    Anesthesia: Spinal    Prophylactic Antibiotics: Ancef  DVT Prophylaxis: Sequential Compression Devices         Fetal Description: saleh     Birth Information:   Information for the patient's :  Jose Quintana [[id number]]   Delivery of a   Female [1] infant on 2018 at 7:53 PM. Apgars were 9 and 9. Umbilical Cord:     Umbilical Cord Events:     Placenta:  removal with  appearance. Amniotic Fluid Volume:        Amniotic Fluid Description:           Umbilical Cord: 3 vessels present    Placenta:  Expressed, calcifications noted    Estimated Blood Loss (ml):  500    Specimens: none           Complications:  none    Procedure Detail:      After proper patient identification and consent, the patient was taken to the operating room, where spinal anesthesia was administered and found to be adequate. López catheter had been placed using sterile technique. The patient was prepped and draped in the normal sterile fashion. The abdomen was entered using the Pfannenstiel technique. The peritoneum was entered well superior to the bladder without any apparent injury. The bladder flap was created without difficulty. A low transverse uterine incision was made with the scalpel and extended. Amniotomy was performed and the fluid was medium amount clear. The babys head was then delivered atraumatically. The nose and mouth were suctioned. The cord was clamped and cut and the baby was handed off to Nursing staff in attendance. Placenta was then removed from the uterus.  The uterus was curettaged with a moist lap pad and cleared of all clots and debris. The uterine incision was closed with 0 Vicryl  in running locking fashion with good hemostasis assured. A second imbricating layer was placed. Good hemostasis was again reassured and the uterus was returned to the abdomen. The pelvis was irrigated with warm normal saline and good hemostasis was again reassured throughout. Intercede placed over hysterotomy. Peritoneum reapproximated w 2-0 vicryl. The fascia was closed with 0 Vicryl in a running fashion. Good hemostasis was assured. The skin was closed with an insorb staple closure. The patient tolerated the procedure well. Sponge, lap, and needle counts were correct times three and the patient and baby were taken to recovery/postpartum room in stable condition.     Jessica Brooke MD  September 5, 2018  8:22 PM

## 2018-09-06 NOTE — L&D DELIVERY NOTE
Delivery Summary  Patient: Fransisco Busby             Circumcision:   NA-female  Additional Delivery Comments - RCS for post-dates      Information for the patient's :  Honorio Fajardo [[id number]]       Labor Events:    Labor: No   Rupture Date:     Rupture Time:     Rupture Type AROM   Amniotic Fluid Volume:      Amniotic Fluid Description:       Induction: None       Augmentation: None   Labor Events: None     Cervical Ripening:     None     Delivery Events:  Episiotomy: None   Laceration(s):       Repaired:      Number of Repair Packets:     Suture Type and Size:       Estimated Blood Loss (ml):  ml       Delivery Date: 2018    Delivery Time: 7:53 PM  Delivery Type: , Low Transverse  Sex:  Female     Gestational Age: 40w1d   Delivery Clinician:  Marycruz Hernandez  Living Status: Living   Delivery Location: L&D OR 1          APGARS  One minute Five minutes Ten minutes   Skin color: 1   1        Heart rate: 2   2        Grimace: 2   2        Muscle tone: 2   2        Breathin   2        Totals: 9   9            Presentation: Vertex    Position:        Resuscitation Method:  Suctioning-bulb; Tactile Stimulation     Meconium Stained:        Cord Vessels: 3 Vessels      Cord Events:    Cord Blood Sent?:  Yes    Blood Gases Sent?:  No    Placenta:  Date/Time:   7:54 PM  Removal: Expressed      Appearance: Normal      Measurements:  Birth Weight:        Birth Length:        Head Circumference:        Chest Circumference:       Abdominal Girth:       Other Providers:   Catrachito Alfaro, Obstetrician;Primary Nurse;Primary Bricelyn Nurse;Nicu Nurse;Neonatologist;Anesthesiologist;Crna;Nurse Practitioner;Midwife;Nursery Nurse           Cord pH:  none    Episiotomy: None   Laceration(s):       Estimated Blood Loss (ml): 500    Labor Events  Method: None      Augmentation: None   Cervical Ripening:       None        Hospital Problems  Date Reviewed: 10/9/2017    None          Operative Vaginal Delivery - none    Group B Strep: No results found for: GRBSEXT, GRBSEXT  Information for the patient's :  Kristyn Jaimes [[id number]]   No results found for: ABORH, PCTABR, PCTDIG, BILI, ABORHEXT, ABORH    No results found for: APH, APCO2, APO2, AHCO3, ABEC, ABDC, O2ST, EPHV, PCO2V, PO2V, HCO3V, EBEV, EBDV, SITE, RSCOM

## 2018-09-06 NOTE — LACTATION NOTE
This note was copied from a baby's chart. Baby nursing well today,  deep latch obtained, mother is comfortable, baby feeding vigorously with rhythmic suck, swallow, breathe pattern, audible swallowing, and evident milk transfer, both breasts offered, baby is asleep following feeding. Baby's frenulum is visible to midline but does not pose an issue with latching, and milk transfer at this time.

## 2018-09-06 NOTE — ROUTINE PROCESS
TRANSFER - IN REPORT: 
 
Verbal report received from [facility] RN(name) on [facility]  being received from labor and delivery(unit) for routine progression of care Report consisted of patients Situation, Background, Assessment and  
Recommendations(SBAR). Information from the following report(s) SBAR, OR Summary, Procedure Summary, Intake/Output, MAR and Recent Results was reviewed with the receiving nurse. Opportunity for questions and clarification was provided. Assessment completed upon patients arrival to unit and care assumed.

## 2018-09-06 NOTE — ANESTHESIA POSTPROCEDURE EVALUATION
Post-Anesthesia Evaluation and Assessment Patient: Rowena Leach MRN: [id number]  SSN: [id number] YOB: 1990  Age: 29 y.o. Sex: female Cardiovascular Function/Vital Signs Visit Vitals  /78  Pulse 71  Temp 37.1 °C (98.8 °F)  Resp 16  
 Ht 4' 11\" (1.499 m)  Wt 53.1 kg (117 lb)  SpO2 100%  Breastfeeding Unknown  BMI 23.63 kg/m2 Patient is status post spinal anesthesia for Procedure(s):  SECTION. Nausea/Vomiting: None Postoperative hydration reviewed and adequate. Pain: 
Pain Scale 1: Numeric (0 - 10) (18) Pain Intensity 1: 0 (18) Managed Neurological Status:  
Neuro (WDL): Within Defined Limits (18) At baseline Mental Status and Level of Consciousness: Arousable Pulmonary Status:  
O2 Device: Room air (18) Adequate oxygenation and airway patent Complications related to anesthesia: None Post-anesthesia assessment completed. No concerns Signed By: Heath Hernandez MD   
 2018

## 2018-09-06 NOTE — PROGRESS NOTES
Anesthesia Post Operative Day 1 Patient is s/p spinal / epidural DuraMorph for Cesearean Section. The patient relates mild pain, mild itching and no  nausea. All symptoms were treated with protocol meds with good results. Patient is up and ambulating without complaints. Plan: Continue Duramorph protocol as needed.

## 2018-09-06 NOTE — PROGRESS NOTES
Post-Operative  Day 1 Jeanna Mendosa  
 
Assessment:  
Hospital Problems  Date Reviewed: 10/9/2017 Codes Class Noted POA Oligohydramnios ICD-10-CM: O41.00X0 ICD-9-CM: 658.00  2018 Unknown Post-Op day 1, stable H/o Crohns dz. Stable. No motrin. Plan:   1. Routine post-operative care 2. Postop hgb 9.9 g/dl. Iron on discharge. will hold on transfusion for now Information for the patient's :  Kelley Ards [[id number]] , Low Transverse Patient doing well without significant complaint. Nausea and vomiting resolved, tolerating liquids, no flatus, lake in place. Current Facility-Administered Medications Medication Dose Route Frequency  lactated Ringers infusion  125 mL/hr IntraVENous CONTINUOUS  
 lactated Ringers infusion  125 mL/hr IntraVENous CONTINUOUS  
 sodium chloride (NS) flush 5-10 mL  5-10 mL IntraVENous Q8H  
 measles, mumps & rubella Vacc (PF) (M-M-R II) injection 0.5 mL  0.5 mL SubCUTAneous PRIOR TO DISCHARGE Vitals: 
Visit Vitals  /70  Pulse 60  Temp 98.4 °F (36.9 °C)  Resp 18  Ht 4' 11\" (1.499 m)  Wt 53.1 kg (117 lb)  SpO2 100%  Breastfeeding Unknown  BMI 23.63 kg/m2 Temp (24hrs), Av.1 °F (36.7 °C), Min:97.4 °F (36.3 °C), Max:98.8 °F (37.1 °C) Last 24hr Input/Output: 
 
Intake/Output Summary (Last 24 hours) at 18 0940 Last data filed at 18 4043 Gross per 24 hour Intake             3000 ml Output             2997 ml Net                3 ml Exam:   
    Patient without distress. Lungs clear. Abdomen, bowel sounds present, soft, expected tenderness, fundus firm Wound dressing intact Perineum normal lochia noted Lower extremities are negative for swelling, cords or tenderness. Labs:  
Lab Results Component Value Date/Time  WBC 14.9 (H) 2018 06:15 AM  
 WBC 9.2 2018 05:35 PM  
 WBC 7.3 01/04/2013 07:15 AM  
 HGB 9.9 (L) 09/06/2018 06:15 AM  
 HGB 10.8 (L) 09/05/2018 05:35 PM  
 HGB 8.1 (L) 01/07/2013 04:45 AM  
 HCT 30.7 (L) 09/06/2018 06:15 AM  
 HCT 33.1 (L) 09/05/2018 05:35 PM  
 HCT 26.0 (L) 01/04/2013 12:58 PM  
 PLATELET 664 68/47/8274 06:15 AM  
 PLATELET 124 08/59/6401 05:35 PM  
 PLATELET 008 80/05/2005 07:15 AM  
 
 
Recent Results (from the past 24 hour(s)) CBC W/O DIFF Collection Time: 09/05/18  5:35 PM  
Result Value Ref Range WBC 9.2 3.6 - 11.0 K/uL  
 RBC 3.76 (L) 3.80 - 5.20 M/uL  
 HGB 10.8 (L) 11.5 - 16.0 g/dL HCT 33.1 (L) 35.0 - 47.0 % MCV 88.0 80.0 - 99.0 FL  
 MCH 28.7 26.0 - 34.0 PG  
 MCHC 32.6 30.0 - 36.5 g/dL  
 RDW 14.6 (H) 11.5 - 14.5 % PLATELET 139 305 - 525 K/uL MPV 11.2 8.9 - 12.9 FL  
 NRBC 0.0 0  WBC ABSOLUTE NRBC 0.00 0.00 - 0.01 K/uL CBC WITH AUTOMATED DIFF Collection Time: 09/06/18  6:15 AM  
Result Value Ref Range WBC 14.9 (H) 3.6 - 11.0 K/uL  
 RBC 3.43 (L) 3.80 - 5.20 M/uL HGB 9.9 (L) 11.5 - 16.0 g/dL HCT 30.7 (L) 35.0 - 47.0 % MCV 89.5 80.0 - 99.0 FL  
 MCH 28.9 26.0 - 34.0 PG  
 MCHC 32.2 30.0 - 36.5 g/dL  
 RDW 14.7 (H) 11.5 - 14.5 % PLATELET 939 883 - 227 K/uL MPV 11.3 8.9 - 12.9 FL  
 NRBC 0.0 0  WBC ABSOLUTE NRBC 0.00 0.00 - 0.01 K/uL NEUTROPHILS 85 (H) 32 - 75 % LYMPHOCYTES 10 (L) 12 - 49 % MONOCYTES 5 5 - 13 % EOSINOPHILS 0 0 - 7 % BASOPHILS 0 0 - 1 % IMMATURE GRANULOCYTES 0 0.0 - 0.5 % ABS. NEUTROPHILS 12.6 (H) 1.8 - 8.0 K/UL  
 ABS. LYMPHOCYTES 1.4 0.8 - 3.5 K/UL  
 ABS. MONOCYTES 0.7 0.0 - 1.0 K/UL  
 ABS. EOSINOPHILS 0.0 0.0 - 0.4 K/UL  
 ABS. BASOPHILS 0.0 0.0 - 0.1 K/UL  
 ABS. IMM. GRANS. 0.1 (H) 0.00 - 0.04 K/UL  
 DF AUTOMATED

## 2018-09-07 PROCEDURE — 77030011255 HC DSG AQUACEL AG BMS -A

## 2018-09-07 PROCEDURE — 65410000002 HC RM PRIVATE OB

## 2018-09-07 PROCEDURE — 74011250637 HC RX REV CODE- 250/637: Performed by: OBSTETRICS & GYNECOLOGY

## 2018-09-07 RX ORDER — LANOLIN ALCOHOL/MO/W.PET/CERES
1 CREAM (GRAM) TOPICAL
Status: DISCONTINUED | OUTPATIENT
Start: 2018-09-07 | End: 2018-09-08 | Stop reason: HOSPADM

## 2018-09-07 RX ADMIN — SIMETHICONE 80 MG: 80 TABLET, CHEWABLE ORAL at 06:01

## 2018-09-07 RX ADMIN — ACETAMINOPHEN 650 MG: 325 TABLET ORAL at 15:09

## 2018-09-07 RX ADMIN — ACETAMINOPHEN 650 MG: 325 TABLET ORAL at 01:25

## 2018-09-07 RX ADMIN — FERROUS SULFATE TAB 325 MG (65 MG ELEMENTAL FE) 325 MG: 325 (65 FE) TAB at 09:11

## 2018-09-07 RX ADMIN — ACETAMINOPHEN 650 MG: 325 TABLET ORAL at 21:43

## 2018-09-07 RX ADMIN — DOCUSATE SODIUM 100 MG: 100 CAPSULE, LIQUID FILLED ORAL at 21:43

## 2018-09-07 RX ADMIN — SIMETHICONE 80 MG: 80 TABLET, CHEWABLE ORAL at 12:33

## 2018-09-07 RX ADMIN — SIMETHICONE 80 MG: 80 TABLET, CHEWABLE ORAL at 19:25

## 2018-09-07 RX ADMIN — ACETAMINOPHEN 650 MG: 325 TABLET ORAL at 05:59

## 2018-09-07 RX ADMIN — ACETAMINOPHEN 650 MG: 325 TABLET ORAL at 10:00

## 2018-09-07 NOTE — ROUTINE PROCESS
1300: Pt calls out for RN while in bathroom. Pt states \"I passed a quarter sized clot and I passed one the same size around 10 am\". RN checks toilet, unable to accurately assess clot size due to toilet paper in the toilet. Fundal check after by primary RN and orientee. Fundus is firm, midline and below umbilicus -1. RN educated pt to call out if she passes another, pt states she understands teaching. 1500: RN at bedside to give tylenol. Pt states \"I passed another clot the same size as before, but I pooped and you can't see it now\". RN waited at bedside for pt to finish, and repeated another fundal check. Fundus is firm, midline and U -1.

## 2018-09-07 NOTE — ROUTINE PROCESS
0730 Bedside report received from Kathya Rolon 1060, RN using ob sbar format. 0  Patient's aquacel dressing was fully saturated with old drainage. Contacted Dr. Estuardo Barr and she said to change dressing and replace with new aquacel and tegaderm. Changed dressing and incision was well approximated with no active bleeding or drainage.

## 2018-09-07 NOTE — PROGRESS NOTES
Nurse called to patients room to look at her suprapubic area. Area noted to be with small amount of edema. And incisional dressing with moderate amount of drainage on it(has not changed from initial shift assessment) . ,  Will continue to [employer] both. Nurse asked patient how the gas pain was in her shoulder. Patient stated that it was still there. will continue to moniter. Called and requested k pad. Patient had also been told that walking in the hallway helps get rid of gas earlier in the shift. Has not been up walking in hallway yet. Will continue to encourage .

## 2018-09-07 NOTE — PROGRESS NOTES
Called to patients room via emergency light. Patient sitting on toliet  . Stated that something fell out of her. Clot size of golf ball noted in toliet and scant bleeding noted on ice pack. Patient instructed to call nurse when she gets off toliet for a fundal check and to check her bleeding. Verbalized an understanding.

## 2018-09-07 NOTE — PROGRESS NOTES
Post-Operative  Day 2 Narayanan Guille  
 
Assessment:  
Hospital Problems  Date Reviewed: 10/9/2017 Codes Class Noted POA Oligohydramnios ICD-10-CM: O41.00X0 ICD-9-CM: 658.00  2018 Unknown Post-Op day 2, doing well 1. H/o Crohns. Stable and doing well. No motrin. 2. Anemia of 8.9 g/dl <- 9.9 g/dl. Stable and asymptomatic_start iron daily and continue on discharge Plan: 1. Routine post-operative care Information for the patient's :  Evin Saavedra [[id number]] , Low Transverse Patient doing well without significant complaint. Nausea and vomiting resolved, tolerating liquids, passing flatus, voiding and ambulating without difficulty. Current Facility-Administered Medications Medication Dose Route Frequency  lactated Ringers infusion  125 mL/hr IntraVENous CONTINUOUS  
 lactated Ringers infusion  125 mL/hr IntraVENous CONTINUOUS  
 sodium chloride (NS) flush 5-10 mL  5-10 mL IntraVENous Q8H  
 measles, mumps & rubella Vacc (PF) (M-M-R II) injection 0.5 mL  0.5 mL SubCUTAneous PRIOR TO DISCHARGE Vitals: 
Visit Vitals  /63 (BP 1 Location: Right arm, BP Patient Position: At rest)  Pulse 73  Temp 97.6 °F (36.4 °C)  Resp 16  
 Ht 4' 11\" (1.499 m)  Wt 53.1 kg (117 lb)  SpO2 100%  Breastfeeding Unknown  BMI 23.63 kg/m2 Temp (24hrs), Av.1 °F (36.7 °C), Min:97.6 °F (36.4 °C), Max:98.4 °F (36.9 °C) Exam:   
    Patient without distress. Abdomen, bowel sounds present, soft, expected tenderness, fundus firm Wound incision clean, dry and intact Lower extremities are negative for swelling, cords or tenderness. Labs:  
Lab Results Component Value Date/Time  WBC 14.9 (H) 2018 06:15 AM  
 WBC 9.2 2018 05:35 PM  
 WBC 7.3 2013 07:15 AM  
 HGB 9.9 (L) 2018 06:15 AM  
 HGB 10.8 (L) 2018 05:35 PM  
 HGB 8.1 (L) 01/07/2013 04:45 AM  
 HCT 30.7 (L) 09/06/2018 06:15 AM  
 HCT 33.1 (L) 09/05/2018 05:35 PM  
 HCT 26.0 (L) 01/04/2013 12:58 PM  
 PLATELET 649 07/95/4033 06:15 AM  
 PLATELET 804 97/17/7468 05:35 PM  
 PLATELET 898 42/41/6362 07:15 AM  
 
 
No results found for this or any previous visit (from the past 24 hour(s)).

## 2018-09-08 VITALS
OXYGEN SATURATION: 100 % | TEMPERATURE: 98.5 F | WEIGHT: 117 LBS | SYSTOLIC BLOOD PRESSURE: 120 MMHG | DIASTOLIC BLOOD PRESSURE: 78 MMHG | BODY MASS INDEX: 23.59 KG/M2 | RESPIRATION RATE: 16 BRPM | HEIGHT: 59 IN | HEART RATE: 88 BPM

## 2018-09-08 PROCEDURE — 74011250637 HC RX REV CODE- 250/637: Performed by: OBSTETRICS & GYNECOLOGY

## 2018-09-08 RX ORDER — OXYCODONE AND ACETAMINOPHEN 5; 325 MG/1; MG/1
1 TABLET ORAL
Qty: 20 TAB | Refills: 0 | Status: SHIPPED | OUTPATIENT
Start: 2018-09-08 | End: 2019-06-12

## 2018-09-08 RX ADMIN — ACETAMINOPHEN 650 MG: 325 TABLET ORAL at 01:39

## 2018-09-08 RX ADMIN — ACETAMINOPHEN 650 MG: 325 TABLET ORAL at 07:08

## 2018-09-08 RX ADMIN — SIMETHICONE 80 MG: 80 TABLET, CHEWABLE ORAL at 01:39

## 2018-09-08 RX ADMIN — ACETAMINOPHEN 650 MG: 325 TABLET ORAL at 10:56

## 2018-09-08 NOTE — LACTATION NOTE
This note was copied from a baby's chart. Baby nursing well and has improved throughout post partum stay, deep latch maintained, mother is comfortable, milk is in transition, baby feeding vigorously with rhythmic suck, swallow, breathe pattern, with audible swallowing, and evident milk transfer, both breasts offered, baby is asleep following feeding. Baby is feeding on demand, voiding and stools present as appropriate over the last 24 hours. Infant has only had a 4% weight loss. Breasts may become engorged when milk \"comes in\". How milk is made / normal phases of milk production, supply and demand discussed. Taught care of engorged breasts - frequent breastfeeding encouraged, warm compresses and breast massage ac. Then nurse the baby or pump. Apply cold compresses pc x 15 minutes a few times a day for swelling or discomfort. May need to do this care for a couple of days.

## 2018-09-08 NOTE — ROUTINE PROCESS
0730 Bedside report received from Shelton Sauer RN using ob sbar format. 1000 I have reviewed discharge instructions with the patient. The patient verbalized understanding and signed discharge instructions using electronic keypad.

## 2018-09-08 NOTE — PROGRESS NOTES
Post-Operative  Day 3 [facility]  
 
 
Assessment: Post-Op day 3, doing well Acute blood loss anemia - hgb 9.9, asymptomatic, Fe on discharge Crohn's disease, stable Plan: 1. Discharge home today 2. Follow up in office in 6 weeks with Mary Jane Vásquez MD 
3. Post partum activity/wound care advised, diet as tolerated 4. Discharge Medications: ibuprofen, percocet and medications prior to admission Information for the patient's :  Marcus Munguia [[id number]] , Low Transverse Patient doing well without significant complaint. Tolerating diet, passing flatus, voiding and ambulating without difficulty Vitals: 
Visit Vitals  /81 (BP 1 Location: Left arm, BP Patient Position: Post activity)  Pulse 71  Temp 97.8 °F (36.6 °C)  Resp 16  
 Ht 4' 11\" (1.499 m)  Wt 53.1 kg (117 lb)  SpO2 100%  Breastfeeding Unknown  BMI 23.63 kg/m2 Temp (24hrs), Av.3 °F (36.8 °C), Min:97.8 °F (36.6 °C), Max:98.7 °F (37.1 °C) Exam:   
    Patient without distress. Abdomen, bowel sounds present, soft, expected tenderness, fundus firm Wound incision clean, dry and intact Lower extremities are negative for swelling, cords or tenderness. Labs:  
Lab Results Component Value Date/Time  WBC 14.9 (H) 2018 06:15 AM  
 WBC 9.2 2018 05:35 PM  
 WBC 7.3 2013 07:15 AM  
 HGB 9.9 (L) 2018 06:15 AM  
 HGB 10.8 (L) 2018 05:35 PM  
 HGB 8.1 (L) 2013 04:45 AM  
 HGB 8.2 (L) 2013 04:13 AM  
 HGB 7.7 (L) 2013 02:15 PM  
 HGB 7.8 (L) 2013 05:17 AM  
 HGB 8.5 (L) 2013 12:58 PM  
 HGB PLEASE DISREGARD RESULTS 2013 12:00 PM  
 HGB 10.3 (L) 2013 07:15 AM  
 HGB 10.6 (L) 2012 12:00 AM  
 HCT 30.7 (L) 2018 06:15 AM  
 HCT 33.1 (L) 2018 05:35 PM  
 HCT 26.0 (L) 2013 12:58 PM  
 HCT 32.0 (L) 2013 07:15 AM  
 HCT 33.7 (L) 06/29/2012 12:00 AM  
 PLATELET 931 06/36/0953 06:15 AM  
 PLATELET 301 42/55/7117 05:35 PM  
 PLATELET 743 18/50/2564 07:15 AM  
 PLATELET 659 (H) 73/46/2479 12:00 AM  
 
 
No results found for this or any previous visit (from the past 24 hour(s)).

## 2018-09-08 NOTE — ROUTINE PROCESS
Bedside and Verbal shift change report given to JOHN Elizondo RN (oncoming nurse) by BHARAT Thorne RN (offgoing nurse). Report included the following information SBAR.

## 2018-09-08 NOTE — DISCHARGE SUMMARY
Obstetrical Discharge Summary     Name: Elisha Cockayne MRN: [id number]  SSN: [id number]    YOB: 1990  Age: 29 y.o. Sex: female      Admit Date: 2018    Discharge Date: 2018     Admitting Physician: Diane Mckeon MD     Attending Physician:  Diane Mckeon MD     Admission Diagnoses: POST TERM  Oligohydramnios    Discharge Diagnoses:   Information for the patient's :  Nina Robbins [[id number]]   Delivery of a 2.97 kg female infant via , Low Transverse on 2018 at 7:53 PM  by . Apgars were 9 and 9. Additional Diagnoses:   Hospital Problems  Date Reviewed: 10/9/2017          Codes Class Noted POA    Oligohydramnios ICD-10-CM: O41.00X0  ICD-9-CM: 658.00  2018 Unknown             Lab Results   Component Value Date/Time    Rubella, External immune 2018       Hospital Course: Normal hospital course following the delivery. Disposition at Discharge: Home or self care    Discharged Condition: Stable    Patient Instructions:   Current Discharge Medication List      START taking these medications    Details   oxyCODONE-acetaminophen (PERCOCET) 5-325 mg per tablet Take 1 Tab by mouth every four (4) hours as needed. Max Daily Amount: 6 Tabs. Qty: 20 Tab, Refills: 0    Associated Diagnoses:  delivery delivered         CONTINUE these medications which have NOT CHANGED    Details   -folic-omega-3-fish oil 400-32.5 mcg-mg chew Take 1 Tab by mouth daily. Qty: 30 Tab, Refills: 4    Associated Diagnoses: Lactating mother             Reference my discharge instructions.     Follow-up Appointments   Procedures    FOLLOW UP VISIT Appointment in: Shahida Hernandez at [facility]     Dr. Pita Hernandez at [facility]     Standing Status:   Standing     Number of Occurrences:   1     Order Specific Question:   Appointment in     Answer:   6 Weeks        Signed By:  Shelia Berry MD     2018

## 2018-09-08 NOTE — DISCHARGE INSTRUCTIONS
POSTPARTUM DISCHARGE INSTRUCTIONS       Name:  Fransisco Busby  YOB: 1990  Admission Diagnosis:  POST TERM  Oligohydramnios     Discharge Diagnosis:    Problem List as of 2018  Date Reviewed: 10/9/2017          Codes Class Noted - Resolved    Oligohydramnios ICD-10-CM: O41.00X0  ICD-9-CM: 658.00  2018 - Present        Crohn disease ([facility] ICD-10-CM: K50.90  ICD-9-CM: 555.9  8/10/2012 - Present    Overview Signed 2013  1:39 PM by Monique Perla MD     S/p R colectomy (13)             Gastritis ICD-10-CM: K29.70  ICD-9-CM: 535.50  2012 - Present    Overview Signed 2012  7:30 AM by Monique Perla MD     EGD (), GI- Dr Rebecca Miranda             Acne ICD-10-CM: L70.9  ICD-9-CM: 706.1  Unknown - Present        Eczema ICD-10-CM: L30.9  ICD-9-CM: 692.9  Unknown - Present        Seasonal allergic rhinitis ICD-10-CM: J30.2  ICD-9-CM: 477.9  Unknown - Present            Attending Physician:  Santosh Price MD    Delivery Type:   Section: What to Expect at Home    Your Recovery:  A  section, or , is surgery to deliver your baby through a cut, called an incision that the doctor makes in your lower belly and uterus. You may have some pain in your lower belly and need pain medicine for 1 to 2 weeks. You can expect some vaginal bleeding for several weeks. You will probably need about 6 weeks to fully recover. It is important to take it easy while the incision is healing. Avoid heavy lifting, strenuous activities, or exercises that strain the belly muscles while you are recovering. Ask a family member or friend for help with housework, cooking, and shopping. This care sheet gives you a general idea about how long it will take for you to recover. But each person recovers at a different pace. Follow the steps below to get better as quickly as possible. How can you care for yourself at home? Activity  · Rest when you feel tired.  Getting enough sleep will help you recover. · Try to walk each day. Start by walking a little more than you did the day before. Bit by bit, increase the amount you walk. Walking boosts blood flow and helps prevent pneumonia, constipation, and blood clots. · Avoid strenuous activities, such as bicycle riding, jogging, weightlifting, and aerobic exercise, for 6 weeks or until your doctor says it is okay. · Until your doctor says it is okay, do not lift anything heavier than your baby. · Do not do sit-ups or other exercise that strain the belly muscles for 6 weeks or until your doctor says it is okay. · Hold a pillow over your incision when you cough or take deep breaths. This will support your belly and decrease your pain. · You may shower as usual. Pat the incision dry when you are done. · You will have some vaginal bleeding. Wear sanitary pads. Do not douche or use tampons until your doctor says it is okay. · Ask your doctor when you can drive again. · You will probably need to take at least 6 weeks off work. It depends on the type of work you do and how you feel. · Wait until you are healed (about 4 to 6 weeks) before you have sexual intercourse. Your doctor will tell you when it is okay to have sex. · Talk to your doctor about birth control. You can get pregnant even before your period returns. Also, you can get pregnant while you are breast-feeding. Incision care  Your skin is your body's first line of defense against germs, but an incision site leaves an easy way for germs to enter your body. To prevent infection:  · Clean your hands frequently and before and after changing any touching any dressings. · If you have strips of tape on the incision, leave the tape on for a week or until it falls off. · Look at your incision closely every day for any changes. Contact your doctor if you experience any signs of infection, such as fever or increased redness at the surgical site.   · Wash the area daily with warm, soapy water, and pat it dry. Don't use hydrogen peroxide or alcohol, which can slow healing. You may cover the area with a gauze bandage if it weeps or rubs against clothing. Change the bandage every day. · Keep the area clean and dry. Diet  · You can eat your normal diet. If your stomach is upset, try bland, low-fat foods like plain rice, broiled chicken, toast, and yogurt. · Drink plenty of fluids (unless your doctor tells you not to). · You may notice that your bowel movements are not regular right after your surgery. This is common. Try to avoid constipation and straining with bowel movements. You may want to take a fiber supplement every day. If you have not had a bowel movement after a couple of days, ask your doctor about taking a mild laxative. · If you are breast-feeding, do not drink any alcohol. Medicines  · Take pain medicines exactly as directed. · If the doctor gave you a prescription medicine for pain, take it as prescribed. · If you are not taking a prescription pain medicine, ask your doctor if you can take an over-the-counter medicine such as acetaminophen (Tylenol), ibuprofen (Advil, Motrin), or naproxen (Aleve), for cramps. Read and follow all instructions on the label. Do not take aspirin, because it can cause more bleeding. Do not take acetaminophen (Tylenol) and other acetaminophen containing medications (i.e. Percocet) at the same time. · If you think your pain medicine is making you sick to your stomach:  · Take your medicine after meals (unless your doctor has told you not to). · Ask your doctor for a different pain medicine. · If your doctor prescribed antibiotics, take them as directed. Do not stop taking them just because you feel better. You need to take the full course of antibiotics. Mental Health  · Many women get the \"baby blues\" during the first few days after childbirth. You may lose sleep, feel irritable, and cry easily.  You may feel happy one minute and sad the next. Hormone changes are one cause of these emotional changes. Also, the demands of a new baby, along with visits from relatives or other family needs, add to a mother's stress. The \"baby blues\" often peak around the fourth day. Then they ease up in less than 2 weeks. · If your moodiness or anxiety lasts for more than 2 weeks, or if you feel like life is not worth living, you may have postpartum depression. This is different for each mother. Some mothers with serious depression may worry intensely about their infant's well-being. Others may feel distant from their child. Some mothers might even feel that they might harm their baby. A mother may have signs of paranoia, wondering if someone is watching her. · With all the changes in your life, you may not know if you are depressed. Pregnancy sometimes causes changes in how you feel that are similar to the symptoms of depression. · Symptoms of depression include:  · Feeling sad or hopeless and losing interest in daily activities. These are the most common symptoms of depression. · Sleeping too much or not enough. · Feeling tired. You may feel as if you have no energy. · Eating too much or too little. · POSTPARTUM [employer] (PSI) offers a Warm line; Chat with the Expert phone sessions; Information and Articles about Pregnancy and Postpartum Mood Disorders; Comprehensive List of Free Support Groups; Knowledgeable local coordinators who will offer support, information, and resources; Guide to Resources on [employer]; Calendar of events in the  mood disorders community; Latest News and Research; and [facility] [address] for [employer]. Remember - You are not alone; You are not to blame; With help, you will be well. 7-764-154-PPD(9326). WWW. POSTPARTUM. NET   · Writing or talking about death, such as writing suicide notes or talking about guns, knives, or pills.  Keep the numbers for these national suicide hotlines: 7-265-848-TALK (9-425-567-550.801.2085) and 7-420-AEIUJKE (2-426.911.7692). If you or someone you know talks about suicide or feeling hopeless, get help right away. Other instructions  · If you breast-feed your baby, you may be more comfortable while you are healing if you place the baby so that he or she is not resting on your belly. Try tucking your baby under your arm, with his or her body along the side you will be feeding on. Support your baby's upper body with your arm. With that hand you can control your baby's head to bring his or her mouth to your breast. This is sometimes called the football hold. Follow-up care is a key part of your treatment and safety. Be sure to make and go to all appointments, and call your doctor if you are having problems. It's also a good idea to know your test results and keep a list of the medicines you take. When should you call for help? Call 911 anytime you think you may need emergency care. For example, call if:  · You are thinking of hurting yourself, your baby, or anyone else. · You passed out (lost consciousness). · You have symptoms of a blood clot in your lung (called a pulmonary embolism). These may include:  · Sudden chest pain. · Trouble breathing. · Coughing up blood. Call your doctor now or seek immediate medical care if:    · You have severe vaginal bleeding. · You are soaking through a pad each hour for 2 or more hours. · Your vaginal bleeding seems to be getting heavier or is still bright red 4 days after delivery. · You are dizzy or lightheaded, or you feel like you may faint. · You are vomiting or cannot keep fluids down. · You have a fever. · You have new or more belly pain. · You have loose stitches, or your incision comes open. · You have symptoms of infection, such as:  · Increased pain, swelling, warmth, or redness. · Red streaks leading from the incision. · Pus draining from the incision. · A fever  · You pass tissue (not just blood).   · Your vaginal discharge smells bad. · Your belly feels tender or full and hard. · Your breasts are continuously painful or red. · You feel sad, anxious, or hopeless for more than a few days. · You have sudden, severe pain in your belly. · You have symptoms of a blood clot in your leg (called a deep vein thrombosis), such as:  · Pain in your calf, back of the knee, thigh, or groin. · Redness and swelling in your leg or groin. · You have symptoms of preeclampsia, such as:  · Sudden swelling of your face, hands, or feet. · New vision problems (such as dimness or blurring). · A severe headache. · Your blood pressure is higher than it should be or rises suddenly. · You have new nausea or vomiting. Watch closely for changes in your health, and be sure to contact your doctor if you have any problems. Additional Information:  Postpartum Support    PARENTS:  Are you feeling sad or depressed? Is it difficult for you to enjoy yourself? Do you feel more irritable or tense? Do you feel anxious or panicky? Are you having difficulty bonding with your baby? Do you feel as if you are \"out of control\" or \"going crazy\"? Are you worried that you might hurt your baby or yourself? FAMILIES: Do you worry that something is wrong but don't know how to help? Do you think that your partner or spouse is having problems coping? Are you worried that it may never get better? While many women experience some mild mood change or \"the blues\" during or after the birth of a child, 1 in 9 women experience more significant symptoms of depression or anxiety. 1 in 10 Dads become depressed during the first year. Things you can do  Being a good parent includes taking care of yourself. If you take care of yourself, you will be able to take better care of your baby and your family. · Talk to a counselor or healthcare provider who has training in  mood and anxiety problems.   · Learn as much as you can about pregnancy and postpartum depression and anxiety. · Get support from family and friends. Ask for help when you need it. · Join a support group in your area or online. · Keep active by walking, stretching or whatever form of exercise helps you to feel better. · Get enough rest and time for yourself. · Eat a healthy diet. · Don't give up! It may take more than one try to get the right help you need. These are general instructions for a healthy lifestyle:    No smoking/ No tobacco products/ Avoid exposure to second hand smoke    Surgeon General's Warning:  Quitting smoking now greatly reduces serious risk to your health. Obesity, smoking, and sedentary lifestyle greatly increases your risk for illness    A healthy diet, regular physical exercise & weight monitoring are important for maintaining a healthy lifestyle    Recognize signs and symptoms of STROKE:    F-face looks uneven    A-arms unable to move or move unevenly    S-speech slurred or non-existent    T-time-call 911 as soon as signs and symptoms begin - DO NOT go       back to bed or wait to see if you get better - TIME IS BRAIN. I have had the opportunity to make my options or choices for discharge. I have received and understand these instructions.

## 2019-06-12 ENCOUNTER — OFFICE VISIT (OUTPATIENT)
Dept: INTERNAL MEDICINE CLINIC | Age: 29
End: 2019-06-12

## 2019-06-12 VITALS
WEIGHT: 87 LBS | DIASTOLIC BLOOD PRESSURE: 52 MMHG | RESPIRATION RATE: 16 BRPM | OXYGEN SATURATION: 98 % | HEIGHT: 59 IN | SYSTOLIC BLOOD PRESSURE: 90 MMHG | HEART RATE: 92 BPM | TEMPERATURE: 98.2 F | BODY MASS INDEX: 17.54 KG/M2

## 2019-06-12 DIAGNOSIS — B37.2 CANDIDAL INTERTRIGO: ICD-10-CM

## 2019-06-12 DIAGNOSIS — K50.818 CROHN'S DISEASE OF BOTH SMALL AND LARGE INTESTINE WITH OTHER COMPLICATION (HCC): ICD-10-CM

## 2019-06-12 DIAGNOSIS — R68.89 UNINTENTIONAL WEIGHT CHANGE: ICD-10-CM

## 2019-06-12 DIAGNOSIS — Z00.00 WELL WOMAN EXAM (NO GYNECOLOGICAL EXAM): Primary | ICD-10-CM

## 2019-06-12 RX ORDER — NYSTATIN 100000 [USP'U]/G
POWDER TOPICAL 4 TIMES DAILY
Qty: 60 G | Refills: 1 | Status: SHIPPED | OUTPATIENT
Start: 2019-06-12

## 2019-06-12 NOTE — PROGRESS NOTES
Identified pt with two pt identifiers(name and ). Reviewed record in preparation for visit and have obtained necessary documentation. All patient medications has been reviewed. Chief Complaint   Patient presents with    Skin Problem     vaginal area (ingrown hair)    Weight Loss       Health Maintenance Due   Topic    DTaP/Tdap/Td series (1 - Tdap)    PAP AKA CERVICAL CYTOLOGY      Pap; done  / [facility]   Vitals:    19 0849   BP: 90/52   Pulse: 92   Resp: 16   Temp: 98.2 °F (36.8 °C)   TempSrc: Oral   SpO2: 98%   Weight: 87 lb (39.5 kg)   Height: 4' 11\" (1.499 m)   PainSc:   0 - No pain       Coordination of Care Questionnaire:   1) Have you been to an emergency room, urgent care, or hospitalized since your last visit?   no       2. Have seen or consulted any other health care provider since your last visit? NO    3) Do you have an Advanced Directive/ Living Will in place? NO  If yes, do we have a copy on file NO  If no, would you like information YES    Patient is accompanied by self I have received verbal consent from [place] to discuss any/all medical information while they are present in the room.

## 2019-06-12 NOTE — PATIENT INSTRUCTIONS
It was a pleasure to see you! As discussed: 
 
Congratulation on your family and the positive choices you've made! We discussed Try finding a therapist using the list provided and www. psychologytoday.com. [facility]/ 
Call:  [phone] Cierra Aguirre: [facility] ([email or web address]) -[facility] Podcast and Facebook Group People to follow Barbara Bruner We ordered labs to look for abnormal causes of weight loss. Continue to work on optimizing your diet. Weight Loss: Care Instructions Your Care Instructions There are two types of weight lossnormal and abnormal. The normal kind happens when you are trying to lose weight by exercising more or eating less. The abnormal kind happens when you are not trying to lose weight. Many medical problems can cause abnormal weight loss. These include problems with your thyroid gland, long-term infections, mouth or throat problems that make it hard to eat, and digestive problems. They also include depression and cancer. Some medicines also may cause you to lose weight. You can work with your doctor to find the cause of your weight loss. You will probably need tests to do this. Follow-up care is a key part of your treatment and safety. Be sure to make and go to all appointments, and call your doctor if you are having problems. It's also a good idea to know your test results and keep a list of the medicines you take. How can you care for yourself at home? · Weigh yourself at the same time every day. It's best to do it first thing in the morning after you empty your bladder. Be sure to always wear the same amount of clothing. · Write down any changes in your weight and the possible causes. Discuss these with your doctor. · Your doctor may want you to change your diet. Do your best to follow his or her advice. · Ask your doctor if you should see a dietitian. This is a person who can help you plan meals that work best for your lifestyle. · Note any changes in bowel habits. These may include changes in how often you have a bowel movement. Other changes include the color and size of your stools and how solid they are. · If you are prescribed medicines, take them exactly as prescribed. Call your doctor if you think you are having a problem with your medicine. You will get more details on the specific medicines your doctor prescribes. When should you call for help? Watch closely for changes in your health, and be sure to contact your doctor if: 
  · You do not get better as expected.  
  · You continue to lose weight. Where can you learn more? Go to http://bebeto-marielos.info/. Enter A790 in the search box to learn more about \"Abnormal Weight Loss: Care Instructions. \" Current as of: June 25, 2018 Content Version: 11.9 © 4872-4625 [employer], [employer]. Care instructions adapted under license by [employer] (which disclaims liability or warranty for this information). If you have questions about a medical condition or this instruction, always ask your healthcare professional. [address] any warranty or liability for your use of this information.

## 2019-06-12 NOTE — PROGRESS NOTES
HISTORY OF PRESENT ILLNESS  Graciela Joya is a 34 y.o. female. HPI   Weight Loss   Graciela Joya presents with concerns over unintentional weight loss. Sarah Poon  She is 9 months, Zori, post partum (also Ayse Moon, 2 yrs old). She is lactating. She tends to not eat when stressed or busy admits she has been missing meals. Prepregnancy weight is 95lbs. Postpartum lowest weight 80lbs (5/28/19). Since then has been intentional about eating. Denies fevers, night chills, no known HIV exposure, significant fhx of cancer. 3 most recent PHQ Screens 10/9/2017   Little interest or pleasure in doing things Not at all   Feeling down, depressed, irritable, or hopeless Not at all   Total Score PHQ 2 0         Crohn's Disease   Followed by Dr. Sarah Poon. She was seen May 31st and had labs done. She reports glucose was \"low\" and Creatinine was \"high\"  Denies melena or hematochezia. Last colonoscopy 2017    Vaginal Concerns  She has increased ingrown hairs- she doesn't shave or wax. Started in pregnancy. She has rawness in the creases of her legs. Denies leakage of urine or feces. ROSper HPI  Patient Active Problem List    Diagnosis Date Noted    Oligohydramnios 09/06/2018    [facility] 08/10/2012    Gastritis 07/30/2012    Acne     Eczema     Seasonal allergic rhinitis        Current Outpatient Medications   Medication Sig Dispense Refill    -folic-omega-3-fish oil 400-32.5 mcg-mg chew Take 1 Tab by mouth daily. 30 Tab 4    oxyCODONE-acetaminophen (PERCOCET) 5-325 mg per tablet Take 1 Tab by mouth every four (4) hours as needed. Max Daily Amount: 6 Tabs. 20 Tab 0       Allergies   Allergen Reactions    Chocolate Flavor Hives and Swelling     Facial swelling      Visit Vitals  BP 90/52 (BP 1 Location: Left arm, BP Patient Position: Sitting)   Pulse 92   Temp 98.2 °F (36.8 °C) (Oral)   Resp 16   Ht 4' 11\" (1.499 m)   Wt 87 lb (39.5 kg)   SpO2 98%   Breastfeeding?  Yes   BMI 17.57 kg/m² Physical Exam   Constitutional: She is oriented to person, place, and time. She appears well-developed and well-nourished. HENT:   Right Ear: External ear normal.   Left Ear: External ear normal.   Mouth/Throat: Oropharynx is clear and moist. No oropharyngeal exudate. Eyes: Conjunctivae are normal. No scleral icterus. Neck: Normal range of motion. Neck supple. No thyromegaly present. Cardiovascular: Normal rate, regular rhythm and normal heart sounds. Exam reveals no gallop and no friction rub. No murmur heard. Pulmonary/Chest: Effort normal and breath sounds normal. No respiratory distress. She has no wheezes. She has no rales. She exhibits no tenderness. Abdominal: Soft. Bowel sounds are normal. She exhibits no distension. There is no tenderness. There is no rebound and no guarding. Genitourinary:   Genitourinary Comments: Deferred for gyn per pt request   Musculoskeletal: Normal range of motion. She exhibits no edema or tenderness. Neurological: She is alert and oriented to person, place, and time. ASSESSMENT and PLAN  Diagnoses and all orders for this visit:    1. Well woman exam (no gynecological exam)- Health Maintenance reviewed and addressed as ordered below     2. Unintentional weight changelikely due to inadequate dietary intake but she is at risk for other secondary causes given her history of Crohn's disease. Will check labs as ordered below  -     TSH 3RD GENERATION  -     T4, FREE  -     VITAMIN D, 25 HYDROXY  -     VITAMIN B12  -     LIPID PANEL  -     METABOLIC PANEL, COMPREHENSIVE  -     CBC WITH AUTOMATED DIFF  -     HEMOGLOBIN A1C WITH EAG  -     SED RATE (ESR)  -     C REACTIVE PROTEIN, QT  -     TREVA IFA W/REFLEX  CASCADE  -     REFERRAL TO NUTRITION    3. Lactating motherdiscussed ways of optimizing self-care and nutrition. 4. Crohn's disease of both small and large intestine with other complication (HCC) endorses no complications.   Followed by gastroenterology. Given weight loss we will check extended autoimmune panel.  -     VITAMIN D, 25 HYDROXY  -     VITAMIN J72  -     METABOLIC PANEL, COMPREHENSIVE  -     CBC WITH AUTOMATED DIFF  -     SED RATE (ESR)  -     C REACTIVE PROTEIN, QT  -     TREVA IFA W/REFLEX  CASCADE  -     REFERRAL TO NUTRITION    5. Candidal intertrigodeclined exam today. Will treat based on signs and symptoms. If no improvement she is aware she needs to follow-up with her gynecologist.-     nystatin (MYCOSTATIN) powder; Apply  to affected area four (4) times daily. As needed          Medication risks/benefits/costs/interactions/alternatives discussed with patient. Alma Rosa Horan  was given an after visit summary which includes diagnoses, current medications, & vitals. she expressed understanding with the diagnosis and plan.

## 2019-06-17 LAB
25(OH)D3+25(OH)D2 SERPL-MCNC: 25.5 NG/ML (ref 30–100)
ALBUMIN SERPL-MCNC: 4.3 G/DL (ref 3.5–5.5)
ALBUMIN/GLOB SERPL: 1.4 {RATIO} (ref 1.2–2.2)
ALP SERPL-CCNC: 118 IU/L (ref 39–117)
ALT SERPL-CCNC: 11 IU/L (ref 0–32)
AST SERPL-CCNC: 20 IU/L (ref 0–40)
BASOPHILS # BLD AUTO: 0 X10E3/UL (ref 0–0.2)
BASOPHILS NFR BLD AUTO: 0 %
BILIRUB SERPL-MCNC: 0.2 MG/DL (ref 0–1.2)
BUN SERPL-MCNC: 22 MG/DL (ref 6–20)
BUN/CREAT SERPL: 34 (ref 9–23)
CALCIUM SERPL-MCNC: 9 MG/DL (ref 8.7–10.2)
CHLORIDE SERPL-SCNC: 105 MMOL/L (ref 96–106)
CHOLEST SERPL-MCNC: 140 MG/DL (ref 100–199)
CO2 SERPL-SCNC: 21 MMOL/L (ref 20–29)
CREAT SERPL-MCNC: 0.64 MG/DL (ref 0.57–1)
CRP SERPL-MCNC: 1 MG/L (ref 0–4.9)
EOSINOPHIL # BLD AUTO: 0.1 X10E3/UL (ref 0–0.4)
EOSINOPHIL NFR BLD AUTO: 1 %
ERYTHROCYTE [DISTWIDTH] IN BLOOD BY AUTOMATED COUNT: 13.5 % (ref 12.3–15.4)
ERYTHROCYTE [SEDIMENTATION RATE] IN BLOOD BY WESTERGREN METHOD: 6 MM/HR (ref 0–32)
EST. AVERAGE GLUCOSE BLD GHB EST-MCNC: 100 MG/DL
GLOBULIN SER CALC-MCNC: 3.1 G/DL (ref 1.5–4.5)
GLUCOSE SERPL-MCNC: 67 MG/DL (ref 65–99)
HBA1C MFR BLD: 5.1 % (ref 4.8–5.6)
HCT VFR BLD AUTO: 38 % (ref 34–46.6)
HDLC SERPL-MCNC: 79 MG/DL
HGB BLD-MCNC: 12.5 G/DL (ref 11.1–15.9)
IMM GRANULOCYTES # BLD AUTO: 0 X10E3/UL (ref 0–0.1)
IMM GRANULOCYTES NFR BLD AUTO: 0 %
LDLC SERPL CALC-MCNC: 55 MG/DL (ref 0–99)
LYMPHOCYTES # BLD AUTO: 1.4 X10E3/UL (ref 0.7–3.1)
LYMPHOCYTES NFR BLD AUTO: 17 %
MCH RBC QN AUTO: 30.8 PG (ref 26.6–33)
MCHC RBC AUTO-ENTMCNC: 32.9 G/DL (ref 31.5–35.7)
MCV RBC AUTO: 94 FL (ref 79–97)
MONOCYTES # BLD AUTO: 0.4 X10E3/UL (ref 0.1–0.9)
MONOCYTES NFR BLD AUTO: 5 %
NEUTROPHILS # BLD AUTO: 6.3 X10E3/UL (ref 1.4–7)
NEUTROPHILS NFR BLD AUTO: 77 %
PLATELET # BLD AUTO: 281 X10E3/UL (ref 150–450)
POTASSIUM SERPL-SCNC: 4.2 MMOL/L (ref 3.5–5.2)
PROT SERPL-MCNC: 7.4 G/DL (ref 6–8.5)
RBC # BLD AUTO: 4.06 X10E6/UL (ref 3.77–5.28)
SODIUM SERPL-SCNC: 140 MMOL/L (ref 134–144)
T4 FREE SERPL-MCNC: 1.18 NG/DL (ref 0.82–1.77)
TRIGL SERPL-MCNC: 30 MG/DL (ref 0–149)
TSH SERPL DL<=0.005 MIU/L-ACNC: 1.81 UIU/ML (ref 0.45–4.5)
VIT B12 SERPL-MCNC: 1116 PG/ML (ref 232–1245)
VLDLC SERPL CALC-MCNC: 6 MG/DL (ref 5–40)
WBC # BLD AUTO: 8.3 X10E3/UL (ref 3.4–10.8)

## 2019-06-18 ENCOUNTER — TELEPHONE (OUTPATIENT)
Dept: INTERNAL MEDICINE CLINIC | Age: 29
End: 2019-06-18

## 2019-06-18 NOTE — PROGRESS NOTES
Hi Mrs. Janine Pemberton,   It was a pleasure to see you at your recent appointment. Thank you for completing your labs. Your labs are mostly normal except  -Your vitamin D is a little low. Start taking an over the counter vitamin D supplement 800-1000 IU/ once a day now. If you are already taking a supplement with vitamin D double the dose (do not exceed more than 5000 international units a day)    There were no critical findings to explain your fatigue or weight loss. Continue to work on the plan we discussed. Lab Review  The remainder of your labs were normal. Some labs that may have been tested and their explanation are:  Your electrolytes, kidney & liver function (Metabolic Panel)   Anemia, blood cells (CBC)  Thyroid (TSH + T4, T3)  Hormones (prolactin, vitamin D )   Pregnancy (Beta HCG)    Diabetes (Hemoglobin A1c)   Autoimmune Disease (TREVA, ESR, CRP, RA, CCP)    Do not hesitate to contact the office if you have any questions or concerns before your next appointment.    Kind regards,   Dr. Solo Acevedo

## 2019-06-19 ENCOUNTER — TELEPHONE (OUTPATIENT)
Dept: INTERNAL MEDICINE CLINIC | Age: 29
End: 2019-06-19

## 2019-06-19 LAB
ANA TITR SER IF: NEGATIVE {TITER}
SPECIMEN STATUS REPORT, ROLRST: NORMAL

## 2019-06-19 NOTE — TELEPHONE ENCOUNTER
Attempted to call [place] no answer left discrete vm for call back. She may be referring to our intertrigo discussion she was treated empirically without examination per request thus she will need to see her dermatologist or gynecologist for oral antibiotics to be prescribed.

## 2021-12-21 ENCOUNTER — TRANSCRIBE ORDER (OUTPATIENT)
Dept: SCHEDULING | Age: 31
End: 2021-12-21

## 2021-12-21 DIAGNOSIS — K50.90 CROHN DISEASE (HCC): Primary | ICD-10-CM

## 2022-03-19 PROBLEM — O41.00X0 OLIGOHYDRAMNIOS: Status: ACTIVE | Noted: 2018-09-06

## 2023-05-23 RX ORDER — NYSTATIN 100000 [USP'U]/G
POWDER TOPICAL 4 TIMES DAILY
COMMUNITY
Start: 2019-06-12

## (undated) DEVICE — SOLUTION IV 1000ML 0.9% SOD CHL

## (undated) DEVICE — PACK PROCEDURE SURG C SECT KT SMH

## (undated) DEVICE — BARRIER TISS ADH ABSRB 3X4IN -- GYNECARE INTERCEED

## (undated) DEVICE — KENDALL SCD EXPRESS SLEEVES, KNEE LENGTH, MEDIUM: Brand: KENDALL SCD

## (undated) DEVICE — STAPLER SKIN SQ 30 ABSRB STPL DISP INSORB

## (undated) DEVICE — SOLIDIFIER MEDC 1200ML -- CONVERT TO 356117

## (undated) DEVICE — 1200 GUARD II KIT W/5MM TUBE W/O VAC TUBE: Brand: GUARDIAN

## (undated) DEVICE — Device: Brand: PORTEX

## (undated) DEVICE — DRAPE FLD WRM W44XL66IN C6L FOR INTRATEMP SYS THERMABASIN

## (undated) DEVICE — SUTURE VCRL SZ 2-0 L36IN ABSRB VLT L36MM CT-1 1/2 CIR J345H

## (undated) DEVICE — DEVON™ KNEE AND BODY STRAP 60" X 3" (1.5 M X 7.6 CM): Brand: DEVON

## (undated) DEVICE — 3000CC GUARDIAN II: Brand: GUARDIAN

## (undated) DEVICE — REM POLYHESIVE ADULT PATIENT RETURN ELECTRODE: Brand: VALLEYLAB

## (undated) DEVICE — STERILE POLYISOPRENE POWDER-FREE SURGICAL GLOVES: Brand: PROTEXIS

## (undated) DEVICE — ROYAL SILK SURGICAL GOWN, XXL: Brand: CONVERTORS

## (undated) DEVICE — DRESSING AQUACEL ADVANTAGE ALG W4XL5IN RECT GREATER ABSRB HYDRFBR TECHNOLOGY

## (undated) DEVICE — SPINAL TRAY EPIDURAL KT FLEXTIP +

## (undated) DEVICE — CATH FOLEY 16F LUBRI-SIL IC --

## (undated) DEVICE — POOLE SUCTION INSTRUMENT WITH REMOVABLE SHEATH: Brand: POOLE

## (undated) DEVICE — MEDI-VAC NON-CONDUCTIVE SUCTION TUBING: Brand: CARDINAL HEALTH

## (undated) DEVICE — TIP CLEANER: Brand: VALLEYLAB

## (undated) DEVICE — SUTURE VCRL SZ 0 L36IN ABSRB VLT L40MM CT 1/2 CIR J358H

## (undated) DEVICE — (D)PREP SKN CHLRAPRP APPL 26ML -- CONVERT TO ITEM 371833

## (undated) DEVICE — COVERALL PREM SMS 2XL KNIT --

## (undated) DEVICE — STERILE POLYISOPRENE POWDER-FREE SURGICAL GLOVES WITH EMOLLIENT COATING: Brand: PROTEXIS

## (undated) DEVICE — SOLUTION IRRIG 1000ML H2O STRL BLT

## (undated) DEVICE — LIGHT HANDLE: Brand: DEVON

## (undated) DEVICE — ROYALSILK SURGICAL GOWN, L: Brand: CONVERTORS